# Patient Record
Sex: FEMALE | Race: WHITE | Employment: UNEMPLOYED | URBAN - METROPOLITAN AREA
[De-identification: names, ages, dates, MRNs, and addresses within clinical notes are randomized per-mention and may not be internally consistent; named-entity substitution may affect disease eponyms.]

---

## 2017-11-30 ENCOUNTER — APPOINTMENT (OUTPATIENT)
Dept: GENERAL RADIOLOGY | Facility: CLINIC | Age: 57
End: 2017-11-30
Payer: COMMERCIAL

## 2017-11-30 ENCOUNTER — HOSPITAL ENCOUNTER (EMERGENCY)
Facility: CLINIC | Age: 57
Discharge: HOME | End: 2017-11-30
Attending: EMERGENCY MEDICINE
Payer: COMMERCIAL

## 2017-11-30 VITALS
BODY MASS INDEX: 34.36 KG/M2 | OXYGEN SATURATION: 96 % | DIASTOLIC BLOOD PRESSURE: 73 MMHG | HEART RATE: 81 BPM | WEIGHT: 175 LBS | HEIGHT: 60 IN | TEMPERATURE: 98.4 F | RESPIRATION RATE: 18 BRPM | SYSTOLIC BLOOD PRESSURE: 132 MMHG

## 2017-11-30 DIAGNOSIS — J40 BRONCHITIS: Primary | ICD-10-CM

## 2017-11-30 PROCEDURE — 71020 XR CHEST STANDARD TWO VW: CPT

## 2017-11-30 PROCEDURE — 99283 EMERGENCY DEPT VISIT LOW MDM: CPT

## 2017-11-30 RX ORDER — ZOLPIDEM TARTRATE 10 MG/1
TABLET ORAL NIGHTLY PRN
COMMUNITY

## 2017-11-30 RX ORDER — OMEPRAZOLE 40 MG/1
40 CAPSULE, DELAYED RELEASE ORAL 2 TIMES DAILY
COMMUNITY

## 2017-11-30 RX ORDER — LEVOTHYROXINE SODIUM 0.12 MG/1
125 TABLET ORAL DAILY
COMMUNITY

## 2017-11-30 RX ORDER — SERTRALINE HYDROCHLORIDE 100 MG/1
100 TABLET, FILM COATED ORAL DAILY
Status: ON HOLD | COMMUNITY
End: 2018-07-26 | Stop reason: ALTCHOICE

## 2017-11-30 RX ORDER — FENOFIBRATE 145 MG/1
145 TABLET, COATED ORAL DAILY
COMMUNITY

## 2017-11-30 RX ORDER — ROPINIROLE 0.25 MG/1
0.25 TABLET, FILM COATED ORAL NIGHTLY
COMMUNITY

## 2017-11-30 RX ORDER — CETIRIZINE HYDROCHLORIDE 5 MG/1
5 TABLET ORAL DAILY
COMMUNITY

## 2017-11-30 RX ORDER — FUROSEMIDE 20 MG/1
20 TABLET ORAL DAILY
COMMUNITY

## 2017-11-30 RX ORDER — AZITHROMYCIN 250 MG/1
TABLET, FILM COATED ORAL
Qty: 1 PACKET | Refills: 0 | Status: SHIPPED | OUTPATIENT
Start: 2017-11-30 | End: 2018-07-25

## 2017-11-30 RX ORDER — ATORVASTATIN CALCIUM 80 MG/1
80 TABLET, FILM COATED ORAL DAILY
COMMUNITY

## 2017-11-30 RX ORDER — MULTIVIT-MIN/IRON/FOLIC ACID/K 18-600-40
2000 CAPSULE ORAL DAILY
COMMUNITY

## 2017-11-30 RX ORDER — CLONAZEPAM 0.5 MG/1
0.5 TABLET ORAL 3 TIMES DAILY PRN
COMMUNITY

## 2017-11-30 RX ORDER — GABAPENTIN 600 MG/1
600 TABLET ORAL 4 TIMES DAILY
COMMUNITY

## 2017-11-30 NOTE — ED PROVIDER NOTES
[facility]  [address] [facility] [address]  Phone: [phone]  eMERGENCY dEPARTMENT eNCOUnter      Pt Name: Sandra Powers  MRN: [id number]  [place] 1960  Date of evaluation: 11/30/2017      CHIEF COMPLAINT       Chief Complaint   Patient presents with    Cough     cold, has had for 6 weeks. not improving. has been to her MD, inhalers used and meds prescribed. dx with bronchitis 10/25          HISTORY OF PRESENT ILLNESS    Sandra Powers is a 62 y.o. female who presents To the emergency department with a persistent cough for the past 6 weeks. She states that early on in the illness she was on an antibiotic for her tooth clindamycin and that didn't help. She was also on a short Medrol Dosepak which also did not help. She's been using an inhaler without relief. She denies any chest pain or shortness of breath. Cough is worse at night. She admits to wheezing. Positive productive cough no recent travel or recent sick contacts. REVIEW OF SYSTEMS       Constitutional: No fevers or chills   HEENT: No sore throat, rhinorrhea, or earache   Eyes: No blurry vision or double vision no drainage   Cardiovascular: No chest pain or tachycardia   Respiratory: Positive wheezing no shortness of breath positive cough  Gastrointestinal: No nausea, vomiting, diarrhea, constipation, or abdominal pain   : No hematuria or dysuria   Musculoskeletal: No swelling or pain   Skin: No rash   Neurological: No focal neurologic complaints, paresthesias, weakness, or headache     Review of Systems  PAST MEDICAL HISTORY    has a past medical history of Arthritis; Asthma; Depression; Diabetes mellitus ([address] and Hyperlipidemia. SURGICAL HISTORY      has a past surgical history that includes Hysterectomy and sinus surgery.     CURRENT MEDICATIONS       Previous Medications    ATORVASTATIN (LIPITOR) 80 MG TABLET    Take 80 mg by mouth daily    BUPROPION (WELLBUTRIN XL) 300 MG EXTENDED RELEASE TABLET    Take 300 mg by mouth every morning    CETIRIZINE (ZYRTEC) 5 MG TABLET    Take 5 mg by mouth daily    CHOLECALCIFEROL (VITAMIN D) 2000 UNITS CAPS CAPSULE    Take by mouth    CLONAZEPAM (KLONOPIN) 0.5 MG TABLET    Take 0.5 mg by mouth 2 times daily as needed . FENOFIBRATE (TRICOR) 145 MG TABLET    Take 145 mg by mouth daily    FLUOXETINE (PROZAC) 20 MG CAPSULE    Take 20 mg by mouth daily    FUROSEMIDE (LASIX) 20 MG TABLET    Take 20 mg by mouth daily    GABAPENTIN (NEURONTIN) 600 MG TABLET    Take 600 mg by mouth 4 times daily    LEVOTHYROXINE (SYNTHROID) 125 MCG TABLET    Take 125 mcg by mouth Daily    OMEPRAZOLE (PRILOSEC) 40 MG DELAYED RELEASE CAPSULE    Take 40 mg by mouth daily    ROPINIROLE (REQUIP) 0.25 MG TABLET    Take 0.25 mg by mouth 3 times daily    SERTRALINE (ZOLOFT) 100 MG TABLET    Take 100 mg by mouth daily    ZOLPIDEM (AMBIEN) 10 MG TABLET    Take by mouth nightly as needed for Sleep . ALLERGIES     is allergic to amoxicillin; asa [aspirin]; ciprofloxacin; keflex [cephalexin]; macrobid [nitrofurantoin monohyd macro]; metformin and related; pcn [penicillins]; quinine derivatives; sulfa antibiotics; tylenol with codeine #3 [acetaminophen-codeine]; erythromycin; and tape [adhesive tape]. FAMILY HISTORY     has no family status information on file. family history is not on file. SOCIAL HISTORY      reports that she is a non-smoker but has been exposed to tobacco smoke. She has never used smokeless tobacco. She reports that she does not drink alcohol or use drugs. PHYSICAL EXAM     INITIAL VITALS:  height is 5' (1.524 m) and weight is 79.4 kg (175 lb). Her oral temperature is 98.4 °F (36.9 °C). Her blood pressure is 133/95 (abnormal) and her pulse is 90. Her respiration is 18 and oxygen saturation is 97%.      Constitutional: Alert, oriented x3, nontoxic, answering questions appropriately, acting properly for age, in no acute distress   HEENT: Extraocular muscles intact, mucus membranes °F (36.9 °C) (Oral)   Resp 18   Ht 5' (1.524 m)   Wt 79.4 kg (175 lb)   SpO2 97%   BMI 34.18 kg/m²     11:45 AM x-ray unremarkable for infiltrate. Will discharge and upper scheduled for a Z-Kranthi also given a spacer for her inhaler for better delivery. Follow-up with family physician return if worsening symptoms or any other concerns. I have reviewed the disposition diagnosis with the patient and or their family/guardian. I have answered their questions and given discharge instructions. They voiced understanding of these instructions and did not have any further questions or complaints. CRITICAL CARE:    None    CONSULTS:    None    PROCEDURES:    None      OARRS Report if indicated             FINAL IMPRESSION      1. Bronchitis          DISPOSITION/PLAN   DISPOSITION Decision to Discharge      PATIENT REFERRED TO:  Family Physician            DISCHARGE MEDICATIONS:  New Prescriptions    AZITHROMYCIN (ZITHROMAX) 250 MG TABLET    Take 2 tablets (500 mg) on Day 1, followed by 1 tablet (250 mg) once daily on Days 2 through 5.        (Please note that portions of this note were completed with a voice recognition program.  Efforts were made to edit the dictations but occasionally words are mis-transcribed.)    Cinda Garcia, DO  Attending Emergency Physician       Cinda Garcia, DO  11/30/17 7028

## 2018-07-25 ENCOUNTER — APPOINTMENT (OUTPATIENT)
Dept: GENERAL RADIOLOGY | Age: 58
DRG: 101 | End: 2018-07-25
Payer: COMMERCIAL

## 2018-07-25 ENCOUNTER — HOSPITAL ENCOUNTER (INPATIENT)
Age: 58
LOS: 1 days | Discharge: HOME | DRG: 101 | End: 2018-07-27
Attending: EMERGENCY MEDICINE | Admitting: INTERNAL MEDICINE
Payer: COMMERCIAL

## 2018-07-25 ENCOUNTER — APPOINTMENT (OUTPATIENT)
Dept: CT IMAGING | Age: 58
DRG: 101 | End: 2018-07-25
Payer: COMMERCIAL

## 2018-07-25 DIAGNOSIS — R56.9 SEIZURE-LIKE ACTIVITY (HCC): Primary | ICD-10-CM

## 2018-07-25 LAB
% CKMB: 1.7 % (ref 0–3)
ABSOLUTE EOS #: 0.2 K/UL (ref 0–0.4)
ABSOLUTE IMMATURE GRANULOCYTE: ABNORMAL K/UL (ref 0–0.3)
ABSOLUTE LYMPH #: 2.2 K/UL (ref 1–4.8)
ABSOLUTE MONO #: 0.7 K/UL (ref 0.2–0.8)
ACETAMINOPHEN LEVEL: <10 UG/ML (ref 10–30)
ALBUMIN SERPL-MCNC: 4.5 G/DL (ref 3.5–5.2)
ALBUMIN/GLOBULIN RATIO: ABNORMAL (ref 1–2.5)
ALP BLD-CCNC: 89 U/L (ref 35–104)
ALT SERPL-CCNC: 34 U/L (ref 5–33)
AMMONIA: 44 UMOL/L (ref 11–51)
ANION GAP SERPL CALCULATED.3IONS-SCNC: 19 MMOL/L (ref 9–17)
AST SERPL-CCNC: 44 U/L
BASOPHILS # BLD: 1 % (ref 0–2)
BASOPHILS ABSOLUTE: 0.1 K/UL (ref 0–0.2)
BILIRUB SERPL-MCNC: 0.35 MG/DL (ref 0.3–1.2)
BILIRUBIN DIRECT: 0.1 MG/DL
BILIRUBIN, INDIRECT: 0.25 MG/DL (ref 0–1)
BUN BLDV-MCNC: 19 MG/DL (ref 6–20)
BUN/CREAT BLD: 13 (ref 9–20)
CALCIUM SERPL-MCNC: 10.3 MG/DL (ref 8.6–10.4)
CHLORIDE BLD-SCNC: 98 MMOL/L (ref 98–107)
CK MB: 1.4 NG/ML
CKMB INTERPRETATION: NORMAL
CO2: 23 MMOL/L (ref 20–31)
CREAT SERPL-MCNC: 1.5 MG/DL (ref 0.5–0.9)
DIFFERENTIAL TYPE: ABNORMAL
EKG ATRIAL RATE: 99 BPM
EKG P AXIS: 58 DEGREES
EKG P-R INTERVAL: 152 MS
EKG Q-T INTERVAL: 380 MS
EKG QRS DURATION: 86 MS
EKG QTC CALCULATION (BAZETT): 487 MS
EKG R AXIS: 84 DEGREES
EKG T AXIS: 46 DEGREES
EKG VENTRICULAR RATE: 99 BPM
EOSINOPHILS RELATIVE PERCENT: 2 % (ref 1–4)
ETHANOL PERCENT: <0.01 %
ETHANOL: <10 MG/DL
GFR AFRICAN AMERICAN: 43 ML/MIN
GFR NON-AFRICAN AMERICAN: 36 ML/MIN
GFR SERPL CREATININE-BSD FRML MDRD: ABNORMAL ML/MIN/{1.73_M2}
GFR SERPL CREATININE-BSD FRML MDRD: ABNORMAL ML/MIN/{1.73_M2}
GLOBULIN: ABNORMAL G/DL (ref 1.5–3.8)
GLUCOSE BLD-MCNC: 208 MG/DL (ref 65–105)
GLUCOSE BLD-MCNC: 213 MG/DL (ref 70–99)
HCT VFR BLD CALC: 44.4 % (ref 36–46)
HEMOGLOBIN: 14.7 G/DL (ref 12–16)
IMMATURE GRANULOCYTES: ABNORMAL %
LYMPHOCYTES # BLD: 21 % (ref 24–44)
MAGNESIUM: 1.7 MG/DL (ref 1.6–2.6)
MCH RBC QN AUTO: 28.1 PG (ref 26–34)
MCHC RBC AUTO-ENTMCNC: 33.1 G/DL (ref 31–37)
MCV RBC AUTO: 84.8 FL (ref 80–100)
MONOCYTES # BLD: 6 % (ref 1–7)
MYOGLOBIN: 112 NG/ML (ref 25–58)
NRBC AUTOMATED: ABNORMAL PER 100 WBC
PDW BLD-RTO: 14.3 % (ref 11.5–14.5)
PLATELET # BLD: 254 K/UL (ref 130–400)
PLATELET ESTIMATE: ABNORMAL
PMV BLD AUTO: 8.4 FL (ref 6–12)
POTASSIUM SERPL-SCNC: 3.5 MMOL/L (ref 3.7–5.3)
RBC # BLD: 5.24 M/UL (ref 4–5.2)
RBC # BLD: ABNORMAL 10*6/UL
SALICYLATE LEVEL: <1 MG/DL (ref 3–10)
SEG NEUTROPHILS: 70 % (ref 36–66)
SEGMENTED NEUTROPHILS ABSOLUTE COUNT: 7.2 K/UL (ref 1.8–7.7)
SODIUM BLD-SCNC: 140 MMOL/L (ref 135–144)
TOTAL CK: 82 U/L (ref 26–192)
TOTAL PROTEIN: 7.3 G/DL (ref 6.4–8.3)
TOXIC TRICYCLIC SC,BLOOD: NEGATIVE
TROPONIN INTERP: ABNORMAL
TROPONIN T: <0.03 NG/ML
WBC # BLD: 10.4 K/UL (ref 3.5–11)
WBC # BLD: ABNORMAL 10*3/UL

## 2018-07-25 PROCEDURE — G0480 DRUG TEST DEF 1-7 CLASSES: HCPCS

## 2018-07-25 PROCEDURE — 82550 ASSAY OF CK (CPK): CPT

## 2018-07-25 PROCEDURE — 93005 ELECTROCARDIOGRAM TRACING: CPT

## 2018-07-25 PROCEDURE — 2580000003 HC RX 258: Performed by: EMERGENCY MEDICINE

## 2018-07-25 PROCEDURE — 80048 BASIC METABOLIC PNL TOTAL CA: CPT

## 2018-07-25 PROCEDURE — 99285 EMERGENCY DEPT VISIT HI MDM: CPT

## 2018-07-25 PROCEDURE — 80307 DRUG TEST PRSMV CHEM ANLYZR: CPT

## 2018-07-25 PROCEDURE — 96374 THER/PROPH/DIAG INJ IV PUSH: CPT

## 2018-07-25 PROCEDURE — 6360000002 HC RX W HCPCS: Performed by: EMERGENCY MEDICINE

## 2018-07-25 PROCEDURE — 71045 X-RAY EXAM CHEST 1 VIEW: CPT

## 2018-07-25 PROCEDURE — 84484 ASSAY OF TROPONIN QUANT: CPT

## 2018-07-25 PROCEDURE — 70450 CT HEAD/BRAIN W/O DYE: CPT

## 2018-07-25 PROCEDURE — 80076 HEPATIC FUNCTION PANEL: CPT

## 2018-07-25 PROCEDURE — 82553 CREATINE MB FRACTION: CPT

## 2018-07-25 PROCEDURE — 83874 ASSAY OF MYOGLOBIN: CPT

## 2018-07-25 PROCEDURE — 87086 URINE CULTURE/COLONY COUNT: CPT

## 2018-07-25 PROCEDURE — 85025 COMPLETE CBC W/AUTO DIFF WBC: CPT

## 2018-07-25 PROCEDURE — 96375 TX/PRO/DX INJ NEW DRUG ADDON: CPT

## 2018-07-25 PROCEDURE — 82140 ASSAY OF AMMONIA: CPT

## 2018-07-25 PROCEDURE — 82947 ASSAY GLUCOSE BLOOD QUANT: CPT

## 2018-07-25 PROCEDURE — 83735 ASSAY OF MAGNESIUM: CPT

## 2018-07-25 RX ORDER — ONDANSETRON 2 MG/ML
4 INJECTION INTRAMUSCULAR; INTRAVENOUS ONCE
Status: COMPLETED | OUTPATIENT
Start: 2018-07-25 | End: 2018-07-25

## 2018-07-25 RX ORDER — SODIUM CHLORIDE 9 MG/ML
INJECTION, SOLUTION INTRAVENOUS CONTINUOUS
Status: DISCONTINUED | OUTPATIENT
Start: 2018-07-25 | End: 2018-07-26 | Stop reason: SDUPTHER

## 2018-07-25 RX ORDER — FENTANYL CITRATE 50 UG/ML
50 INJECTION, SOLUTION INTRAMUSCULAR; INTRAVENOUS ONCE
Status: COMPLETED | OUTPATIENT
Start: 2018-07-25 | End: 2018-07-25

## 2018-07-25 RX ADMIN — SODIUM CHLORIDE: 9 INJECTION, SOLUTION INTRAVENOUS at 22:03

## 2018-07-25 RX ADMIN — ONDANSETRON HYDROCHLORIDE 4 MG: 2 INJECTION, SOLUTION INTRAMUSCULAR; INTRAVENOUS at 22:26

## 2018-07-25 RX ADMIN — FENTANYL CITRATE 50 MCG: 50 INJECTION, SOLUTION INTRAMUSCULAR; INTRAVENOUS at 22:26

## 2018-07-25 ASSESSMENT — PAIN DESCRIPTION - LOCATION: LOCATION: HEAD

## 2018-07-25 ASSESSMENT — PAIN DESCRIPTION - PAIN TYPE: TYPE: ACUTE PAIN

## 2018-07-25 ASSESSMENT — PAIN SCALES - GENERAL
PAINLEVEL_OUTOF10: 7
PAINLEVEL_OUTOF10: 7

## 2018-07-25 ASSESSMENT — PAIN DESCRIPTION - DESCRIPTORS: DESCRIPTORS: HEADACHE

## 2018-07-26 ENCOUNTER — APPOINTMENT (OUTPATIENT)
Dept: MRI IMAGING | Age: 58
DRG: 101 | End: 2018-07-26
Payer: COMMERCIAL

## 2018-07-26 PROBLEM — F41.8 DEPRESSION WITH ANXIETY: Status: ACTIVE | Noted: 2018-04-11

## 2018-07-26 PROBLEM — M19.90 OSTEOARTHROSIS: Status: ACTIVE | Noted: 2018-07-26

## 2018-07-26 PROBLEM — M51.369 DEGENERATION OF LUMBAR INTERVERTEBRAL DISC: Status: ACTIVE | Noted: 2018-07-26

## 2018-07-26 PROBLEM — L98.9 SKIN LESION: Status: ACTIVE | Noted: 2018-07-26

## 2018-07-26 PROBLEM — S83.249A TEAR OF MEDIAL MENISCUS OF KNEE: Status: ACTIVE | Noted: 2018-07-26

## 2018-07-26 PROBLEM — S83.209A TEAR OF MENISCUS OF KNEE: Status: ACTIVE | Noted: 2018-07-26

## 2018-07-26 PROBLEM — R21 SKIN ERUPTION: Status: ACTIVE | Noted: 2018-07-26

## 2018-07-26 PROBLEM — Z78.9 NON-SMOKER: Status: ACTIVE | Noted: 2017-03-10

## 2018-07-26 PROBLEM — J30.2 SEASONAL ALLERGIC RHINITIS: Status: ACTIVE | Noted: 2017-06-15

## 2018-07-26 PROBLEM — G89.4 CHRONIC PAIN DISORDER: Status: ACTIVE | Noted: 2017-10-25

## 2018-07-26 PROBLEM — M25.569 KNEE PAIN: Status: ACTIVE | Noted: 2018-07-26

## 2018-07-26 PROBLEM — M51.36 DEGENERATION OF LUMBAR INTERVERTEBRAL DISC: Status: ACTIVE | Noted: 2018-07-26

## 2018-07-26 PROBLEM — M71.20 SYNOVIAL CYST OF KNEE: Status: ACTIVE | Noted: 2018-07-26

## 2018-07-26 PROBLEM — Z12.31 ENCOUNTER FOR SCREENING MAMMOGRAM FOR MALIGNANT NEOPLASM OF BREAST: Status: ACTIVE | Noted: 2017-03-10

## 2018-07-26 PROBLEM — R07.89 OTHER CHEST PAIN: Status: ACTIVE | Noted: 2018-06-01

## 2018-07-26 PROBLEM — M17.9 OSTEOARTHRITIS OF KNEE: Status: ACTIVE | Noted: 2018-07-26

## 2018-07-26 PROBLEM — M70.50 PES ANSERINUS BURSITIS: Status: ACTIVE | Noted: 2018-07-26

## 2018-07-26 PROBLEM — R56.9 SEIZURE (HCC): Status: ACTIVE | Noted: 2018-07-26

## 2018-07-26 LAB
ALBUMIN SERPL-MCNC: 4.2 G/DL (ref 3.5–5.2)
ALBUMIN/GLOBULIN RATIO: ABNORMAL (ref 1–2.5)
ALP BLD-CCNC: 77 U/L (ref 35–104)
ALT SERPL-CCNC: 29 U/L (ref 5–33)
AMPHETAMINE SCREEN URINE: NEGATIVE
ANION GAP SERPL CALCULATED.3IONS-SCNC: 11 MMOL/L (ref 9–17)
AST SERPL-CCNC: 33 U/L
BARBITURATE SCREEN URINE: NEGATIVE
BENZODIAZEPINE SCREEN, URINE: NEGATIVE
BILIRUB SERPL-MCNC: 0.32 MG/DL (ref 0.3–1.2)
BILIRUBIN DIRECT: 0.1 MG/DL
BILIRUBIN URINE: NEGATIVE
BILIRUBIN, INDIRECT: 0.22 MG/DL (ref 0–1)
BUN BLDV-MCNC: 25 MG/DL (ref 6–20)
BUPRENORPHINE URINE: NORMAL
CALCIUM SERPL-MCNC: 9.3 MG/DL (ref 8.6–10.4)
CANNABINOID SCREEN URINE: NEGATIVE
CHLORIDE BLD-SCNC: 104 MMOL/L (ref 98–107)
CO2: 26 MMOL/L (ref 20–31)
COCAINE METABOLITE, URINE: NEGATIVE
COLOR: YELLOW
COMMENT UA: NORMAL
CREAT SERPL-MCNC: 1.38 MG/DL (ref 0.5–0.9)
GFR AFRICAN AMERICAN: 48 ML/MIN
GFR NON-AFRICAN AMERICAN: 39 ML/MIN
GFR SERPL CREATININE-BSD FRML MDRD: ABNORMAL ML/MIN/{1.73_M2}
GFR SERPL CREATININE-BSD FRML MDRD: ABNORMAL ML/MIN/{1.73_M2}
GLUCOSE BLD-MCNC: 129 MG/DL (ref 65–105)
GLUCOSE BLD-MCNC: 140 MG/DL (ref 65–105)
GLUCOSE BLD-MCNC: 143 MG/DL (ref 65–105)
GLUCOSE BLD-MCNC: 152 MG/DL (ref 65–105)
GLUCOSE BLD-MCNC: 157 MG/DL (ref 70–99)
GLUCOSE BLD-MCNC: 82 MG/DL (ref 65–105)
GLUCOSE URINE: NEGATIVE
KETONES, URINE: NEGATIVE
LEUKOCYTE ESTERASE, URINE: NEGATIVE
MDMA URINE: NORMAL
METHADONE SCREEN, URINE: NEGATIVE
METHAMPHETAMINE, URINE: NORMAL
NITRITE, URINE: NEGATIVE
OPIATES, URINE: NEGATIVE
OXYCODONE SCREEN URINE: NEGATIVE
PH UA: 6.5 (ref 5–8)
PHENCYCLIDINE, URINE: NEGATIVE
POTASSIUM SERPL-SCNC: 3.9 MMOL/L (ref 3.7–5.3)
PROPOXYPHENE, URINE: NORMAL
PROTEIN UA: NEGATIVE
SODIUM BLD-SCNC: 141 MMOL/L (ref 135–144)
SPECIFIC GRAVITY UA: 1.01 (ref 1–1.03)
TEST INFORMATION: NORMAL
THYROXINE, FREE: 1.34 NG/DL (ref 0.93–1.7)
TOTAL PROTEIN: 6.3 G/DL (ref 6.4–8.3)
TRICYCLIC ANTIDEPRESSANTS, UR: NORMAL
TSH SERPL DL<=0.05 MIU/L-ACNC: 0.21 MIU/L (ref 0.3–5)
TURBIDITY: CLEAR
URINE HGB: NEGATIVE
UROBILINOGEN, URINE: NORMAL

## 2018-07-26 PROCEDURE — 36415 COLL VENOUS BLD VENIPUNCTURE: CPT

## 2018-07-26 PROCEDURE — 97161 PT EVAL LOW COMPLEX 20 MIN: CPT

## 2018-07-26 PROCEDURE — 6370000000 HC RX 637 (ALT 250 FOR IP): Performed by: INTERNAL MEDICINE

## 2018-07-26 PROCEDURE — 97530 THERAPEUTIC ACTIVITIES: CPT

## 2018-07-26 PROCEDURE — 2580000003 HC RX 258: Performed by: NURSE PRACTITIONER

## 2018-07-26 PROCEDURE — 6370000000 HC RX 637 (ALT 250 FOR IP): Performed by: NURSE PRACTITIONER

## 2018-07-26 PROCEDURE — 70551 MRI BRAIN STEM W/O DYE: CPT

## 2018-07-26 PROCEDURE — 80053 COMPREHEN METABOLIC PANEL: CPT

## 2018-07-26 PROCEDURE — 97116 GAIT TRAINING THERAPY: CPT

## 2018-07-26 PROCEDURE — 82947 ASSAY GLUCOSE BLOOD QUANT: CPT

## 2018-07-26 PROCEDURE — G8978 MOBILITY CURRENT STATUS: HCPCS

## 2018-07-26 PROCEDURE — 84443 ASSAY THYROID STIM HORMONE: CPT

## 2018-07-26 PROCEDURE — 84439 ASSAY OF FREE THYROXINE: CPT

## 2018-07-26 PROCEDURE — 82248 BILIRUBIN DIRECT: CPT

## 2018-07-26 PROCEDURE — 6360000002 HC RX W HCPCS: Performed by: NURSE PRACTITIONER

## 2018-07-26 PROCEDURE — 81003 URINALYSIS AUTO W/O SCOPE: CPT

## 2018-07-26 PROCEDURE — 95816 EEG AWAKE AND DROWSY: CPT

## 2018-07-26 PROCEDURE — 1200000000 HC SEMI PRIVATE

## 2018-07-26 PROCEDURE — 99223 1ST HOSP IP/OBS HIGH 75: CPT | Performed by: INTERNAL MEDICINE

## 2018-07-26 PROCEDURE — G8979 MOBILITY GOAL STATUS: HCPCS

## 2018-07-26 RX ORDER — SODIUM CHLORIDE 0.9 % (FLUSH) 0.9 %
10 SYRINGE (ML) INJECTION EVERY 12 HOURS SCHEDULED
Status: DISCONTINUED | OUTPATIENT
Start: 2018-07-26 | End: 2018-07-27 | Stop reason: HOSPADM

## 2018-07-26 RX ORDER — NICOTINE POLACRILEX 4 MG
15 LOZENGE BUCCAL PRN
Status: DISCONTINUED | OUTPATIENT
Start: 2018-07-26 | End: 2018-07-27 | Stop reason: HOSPADM

## 2018-07-26 RX ORDER — ATORVASTATIN CALCIUM 80 MG/1
80 TABLET, FILM COATED ORAL DAILY
Status: DISCONTINUED | OUTPATIENT
Start: 2018-07-26 | End: 2018-07-27 | Stop reason: HOSPADM

## 2018-07-26 RX ORDER — PANTOPRAZOLE SODIUM 40 MG/1
40 TABLET, DELAYED RELEASE ORAL
Status: DISCONTINUED | OUTPATIENT
Start: 2018-07-27 | End: 2018-07-27 | Stop reason: HOSPADM

## 2018-07-26 RX ORDER — DEXTROSE MONOHYDRATE 50 MG/ML
100 INJECTION, SOLUTION INTRAVENOUS PRN
Status: DISCONTINUED | OUTPATIENT
Start: 2018-07-26 | End: 2018-07-27 | Stop reason: HOSPADM

## 2018-07-26 RX ORDER — KETOROLAC TROMETHAMINE 15 MG/ML
15 INJECTION, SOLUTION INTRAMUSCULAR; INTRAVENOUS EVERY 6 HOURS
Status: DISCONTINUED | OUTPATIENT
Start: 2018-07-26 | End: 2018-07-27 | Stop reason: HOSPADM

## 2018-07-26 RX ORDER — PANTOPRAZOLE SODIUM 40 MG/1
40 TABLET, DELAYED RELEASE ORAL
Status: DISCONTINUED | OUTPATIENT
Start: 2018-07-26 | End: 2018-07-26

## 2018-07-26 RX ORDER — BUPROPION HYDROCHLORIDE 150 MG/1
300 TABLET ORAL EVERY MORNING
Status: DISCONTINUED | OUTPATIENT
Start: 2018-07-26 | End: 2018-07-26

## 2018-07-26 RX ORDER — LEVOTHYROXINE SODIUM 0.12 MG/1
125 TABLET ORAL DAILY
Status: DISCONTINUED | OUTPATIENT
Start: 2018-07-26 | End: 2018-07-27 | Stop reason: HOSPADM

## 2018-07-26 RX ORDER — DEXTROSE MONOHYDRATE 25 G/50ML
12.5 INJECTION, SOLUTION INTRAVENOUS PRN
Status: DISCONTINUED | OUTPATIENT
Start: 2018-07-26 | End: 2018-07-27 | Stop reason: HOSPADM

## 2018-07-26 RX ORDER — TIZANIDINE 2 MG/1
2 TABLET ORAL 2 TIMES DAILY PRN
COMMUNITY

## 2018-07-26 RX ORDER — OXYCODONE HYDROCHLORIDE AND ACETAMINOPHEN 5; 325 MG/1; MG/1
1 TABLET ORAL EVERY 8 HOURS PRN
COMMUNITY

## 2018-07-26 RX ORDER — GABAPENTIN 600 MG/1
600 TABLET ORAL 4 TIMES DAILY
Status: DISCONTINUED | OUTPATIENT
Start: 2018-07-26 | End: 2018-07-26

## 2018-07-26 RX ORDER — ONDANSETRON 2 MG/ML
4 INJECTION INTRAMUSCULAR; INTRAVENOUS EVERY 6 HOURS PRN
Status: DISCONTINUED | OUTPATIENT
Start: 2018-07-26 | End: 2018-07-27 | Stop reason: HOSPADM

## 2018-07-26 RX ORDER — FENOFIBRATE 160 MG/1
160 TABLET ORAL DAILY
Status: DISCONTINUED | OUTPATIENT
Start: 2018-07-26 | End: 2018-07-27 | Stop reason: HOSPADM

## 2018-07-26 RX ORDER — GABAPENTIN 300 MG/1
600 CAPSULE ORAL 2 TIMES DAILY
Status: DISCONTINUED | OUTPATIENT
Start: 2018-07-26 | End: 2018-07-27 | Stop reason: HOSPADM

## 2018-07-26 RX ORDER — ONDANSETRON 4 MG/1
4 TABLET, ORALLY DISINTEGRATING ORAL EVERY 6 HOURS PRN
Status: DISCONTINUED | OUTPATIENT
Start: 2018-07-26 | End: 2018-07-27 | Stop reason: HOSPADM

## 2018-07-26 RX ORDER — SODIUM CHLORIDE 0.9 % (FLUSH) 0.9 %
10 SYRINGE (ML) INJECTION PRN
Status: DISCONTINUED | OUTPATIENT
Start: 2018-07-26 | End: 2018-07-27 | Stop reason: HOSPADM

## 2018-07-26 RX ORDER — FUROSEMIDE 20 MG/1
20 TABLET ORAL DAILY
Status: DISCONTINUED | OUTPATIENT
Start: 2018-07-26 | End: 2018-07-27 | Stop reason: HOSPADM

## 2018-07-26 RX ORDER — LORAZEPAM 2 MG/ML
2 INJECTION INTRAMUSCULAR EVERY 4 HOURS PRN
Status: DISCONTINUED | OUTPATIENT
Start: 2018-07-26 | End: 2018-07-27 | Stop reason: HOSPADM

## 2018-07-26 RX ORDER — KETOROLAC TROMETHAMINE 30 MG/ML
30 INJECTION, SOLUTION INTRAMUSCULAR; INTRAVENOUS EVERY 6 HOURS
Status: DISCONTINUED | OUTPATIENT
Start: 2018-07-26 | End: 2018-07-26 | Stop reason: DRUGHIGH

## 2018-07-26 RX ORDER — ALBUTEROL SULFATE 90 UG/1
2 AEROSOL, METERED RESPIRATORY (INHALATION) EVERY 6 HOURS PRN
COMMUNITY

## 2018-07-26 RX ORDER — ROPINIROLE 0.25 MG/1
0.25 TABLET, FILM COATED ORAL NIGHTLY
Status: DISCONTINUED | OUTPATIENT
Start: 2018-07-26 | End: 2018-07-27 | Stop reason: HOSPADM

## 2018-07-26 RX ORDER — FLUOXETINE HYDROCHLORIDE 20 MG/1
20 CAPSULE ORAL DAILY
Status: DISCONTINUED | OUTPATIENT
Start: 2018-07-26 | End: 2018-07-26

## 2018-07-26 RX ORDER — ACETAMINOPHEN 500 MG
1000 TABLET ORAL EVERY 6 HOURS PRN
Status: DISCONTINUED | OUTPATIENT
Start: 2018-07-26 | End: 2018-07-27 | Stop reason: HOSPADM

## 2018-07-26 RX ORDER — ROPINIROLE 0.25 MG/1
0.25 TABLET, FILM COATED ORAL 3 TIMES DAILY
Status: DISCONTINUED | OUTPATIENT
Start: 2018-07-26 | End: 2018-07-26

## 2018-07-26 RX ORDER — NICOTINE 21 MG/24HR
1 PATCH, TRANSDERMAL 24 HOURS TRANSDERMAL DAILY PRN
Status: DISCONTINUED | OUTPATIENT
Start: 2018-07-26 | End: 2018-07-27 | Stop reason: HOSPADM

## 2018-07-26 RX ORDER — RANITIDINE 300 MG/1
300 TABLET ORAL NIGHTLY
COMMUNITY

## 2018-07-26 RX ORDER — SODIUM CHLORIDE 9 MG/ML
INJECTION, SOLUTION INTRAVENOUS CONTINUOUS
Status: DISCONTINUED | OUTPATIENT
Start: 2018-07-26 | End: 2018-07-27 | Stop reason: HOSPADM

## 2018-07-26 RX ORDER — ONDANSETRON 2 MG/ML
4 INJECTION INTRAMUSCULAR; INTRAVENOUS EVERY 6 HOURS PRN
Status: DISCONTINUED | OUTPATIENT
Start: 2018-07-26 | End: 2018-07-26 | Stop reason: SDUPTHER

## 2018-07-26 RX ORDER — FLUOXETINE HYDROCHLORIDE 20 MG/1
60 CAPSULE ORAL DAILY
Status: DISCONTINUED | OUTPATIENT
Start: 2018-07-26 | End: 2018-07-27 | Stop reason: HOSPADM

## 2018-07-26 RX ORDER — BISACODYL 10 MG
10 SUPPOSITORY, RECTAL RECTAL DAILY PRN
Status: DISCONTINUED | OUTPATIENT
Start: 2018-07-26 | End: 2018-07-27 | Stop reason: HOSPADM

## 2018-07-26 RX ORDER — FAMOTIDINE 20 MG/1
20 TABLET, FILM COATED ORAL NIGHTLY
Status: DISCONTINUED | OUTPATIENT
Start: 2018-07-26 | End: 2018-07-27 | Stop reason: HOSPADM

## 2018-07-26 RX ADMIN — SODIUM CHLORIDE: 9 INJECTION, SOLUTION INTRAVENOUS at 10:48

## 2018-07-26 RX ADMIN — ENOXAPARIN SODIUM 40 MG: 40 INJECTION SUBCUTANEOUS at 10:44

## 2018-07-26 RX ADMIN — KETOROLAC TROMETHAMINE 15 MG: 15 INJECTION, SOLUTION INTRAMUSCULAR; INTRAVENOUS at 17:53

## 2018-07-26 RX ADMIN — FUROSEMIDE 20 MG: 20 TABLET ORAL at 15:05

## 2018-07-26 RX ADMIN — ROPINIROLE HYDROCHLORIDE 0.25 MG: 0.25 TABLET, FILM COATED ORAL at 20:42

## 2018-07-26 RX ADMIN — ATORVASTATIN CALCIUM 80 MG: 80 TABLET, FILM COATED ORAL at 20:42

## 2018-07-26 RX ADMIN — INSULIN LISPRO 2 UNITS: 100 INJECTION, SOLUTION INTRAVENOUS; SUBCUTANEOUS at 12:37

## 2018-07-26 RX ADMIN — KETOROLAC TROMETHAMINE 15 MG: 15 INJECTION, SOLUTION INTRAMUSCULAR; INTRAVENOUS at 10:44

## 2018-07-26 RX ADMIN — FLUOXETINE 60 MG: 20 CAPSULE ORAL at 17:52

## 2018-07-26 RX ADMIN — SODIUM CHLORIDE: 9 INJECTION, SOLUTION INTRAVENOUS at 04:32

## 2018-07-26 RX ADMIN — SODIUM CHLORIDE: 9 INJECTION, SOLUTION INTRAVENOUS at 22:44

## 2018-07-26 RX ADMIN — LINAGLIPTIN 5 MG: 5 TABLET, FILM COATED ORAL at 17:54

## 2018-07-26 RX ADMIN — GABAPENTIN 600 MG: 300 CAPSULE ORAL at 20:42

## 2018-07-26 RX ADMIN — FAMOTIDINE 20 MG: 20 TABLET ORAL at 20:42

## 2018-07-26 RX ADMIN — KETOROLAC TROMETHAMINE 15 MG: 15 INJECTION, SOLUTION INTRAMUSCULAR; INTRAVENOUS at 22:40

## 2018-07-26 ASSESSMENT — PAIN SCALES - GENERAL
PAINLEVEL_OUTOF10: 7
PAINLEVEL_OUTOF10: 9
PAINLEVEL_OUTOF10: 9
PAINLEVEL_OUTOF10: 7
PAINLEVEL_OUTOF10: 5
PAINLEVEL_OUTOF10: 10

## 2018-07-26 ASSESSMENT — PAIN DESCRIPTION - PAIN TYPE
TYPE: ACUTE PAIN

## 2018-07-26 ASSESSMENT — PAIN DESCRIPTION - ONSET
ONSET: ON-GOING
ONSET: ON-GOING

## 2018-07-26 ASSESSMENT — PAIN DESCRIPTION - PROGRESSION: CLINICAL_PROGRESSION: GRADUALLY IMPROVING

## 2018-07-26 ASSESSMENT — PAIN DESCRIPTION - DESCRIPTORS
DESCRIPTORS: HEADACHE
DESCRIPTORS: HEADACHE

## 2018-07-26 ASSESSMENT — PAIN DESCRIPTION - LOCATION
LOCATION: HEAD

## 2018-07-26 ASSESSMENT — PAIN DESCRIPTION - FREQUENCY
FREQUENCY: CONTINUOUS
FREQUENCY: INTERMITTENT

## 2018-07-26 NOTE — PLAN OF CARE
Problem: Falls - Risk of:  Goal: Will remain free from falls  Will remain free from falls   Outcome: Ongoing  Patient is a fall risk during this admission. Fall risk assessment was performed. Patient is absent of falls. Bed is in the lowest position. Wheels on the bed are locked. Call light and bed side table are within reach. Clutter is removed. Patient was educated to call out when needing assistance or wanting to get out of bed. Patient offered toileting assistance during rounding. Hourly rounds have been performed.

## 2018-07-26 NOTE — CONSULTS
[address], [address] [place] [address]                                   CONSULTATION    PATIENT NAME: Loretta Gutierrez                   :        1960  MED REC NO:   [id number]                             ROOM:       2020  ACCOUNT NO:   [de-identified]                           ADMIT DATE: 2018  PROVIDER:     Constantine Aguila DATE:  2018    HISTORY OF PRESENT ILLNESS:  This patient is a 49-year-old female whom I am  asked to see in Neurology consultation for advice and opinion regarding  seizures. The patient states that she has not been feeling well recently. She was at a restaurant when she felt very ill and developed shaking,  jerking movements with poor responsiveness with postictal confusion,  fatigue, and headache. She did not bite her tongue. She did not lose  control of her bladder or bowel. She had no preceding aura. She has had  no recent head injuries. She has had no history of meningitis or  encephalitis. She has never had a seizure before. There is no family  history of seizures. She states that she has been feeling ill for some  time. She did turn blue and was \"foaming at the mouth. \"  The episode  lasted for about a minute. She does not remember anything about that. PAST MEDICAL HISTORY:  Significant for arthritis, asthma, depression,  diabetes, hyperlipidemia, but negative for cancer, stroke, or seizures. FAMILY HISTORY:  Noncontributory. REVIEW OF SYSTEMS:  She denied any chest pain, shortness of breath,  abdominal pain, hematochezia, hematuria, nosebleed, rash, or fever. PERSONAL AND SOCIAL HISTORY:  She is a nonsmoker, but is exposed to  secondhand smoke. She does not use alcohol or street drugs. MEDICATIONS:  Are as on the electronic health record.     ALLERGIES:  She has allergies to AMOXICILLIN, ASPIRIN, CIPROFLOXACIN,  KEFLEX, MACROBID, METFORMIN, PENICILLIN, QUININE,

## 2018-07-26 NOTE — PROGRESS NOTES
Pharmacy Accuracy Service Medication History Note    The patient's list of current home medications has been reviewed. Source(s) of information: patient, Maria Guadalupe Nieves    Based on information provided by the above source(s), I have updated the patient's home med list as described below. Please review the ACTION REQUESTED BY PHYSICIAN section of this note below for any discrepancies on current hospital orders. I changed or updated the following medications on the patient's home medication list:  Discontinued · Sertraline 100mg (takes Fluoxetine instead-already on list)     Added · Tresiba Flextouch 120 units every morning (note: pt admits she does not take this regularly due to laziness/forgetfulness. States her last dose was about 2 days ago)  · Percocet 5/325mg TID prn  · Ranitidine 300mg nightly  · Sitagliptin 50mg daily  · Albuterol HFA 2 puffs q6h prn  · Tizanidine 2mg BID prn     Adjusted   · Bupropion XL 300mg daily adjusted to XL 450mg daily  · Vitamin D 2000 units (no directions) adjusted to once daily  · Fluoxetine 20mg daily adjusted to 60mg daily  · Omeprazole 40mg daily adjusted to 40mg BID  · Ropinirole 0.25mg TID adjusted to 0.25mg nightly         PHYSICIAN ACTION REQUESTED  Discrepancies on current hospital orders that need to be addressed by a physician:    Medication Action Requested   Meds added   (see above)   Please review and reorder as appropriate   Bupropion XL   Contraindicated with seizure. Please assess whether pt is to continue this medication inpatient--the order is active although she has not yet received any doses in hospital.     Gabapentin   Recommended dosing frequency for current CrCl 40mL/min is BID instead of QID. Please review and adjust order.      Fluoxetine,  Pantoprazole    Please adjust order to match home regimen as appopriate:        Fluoxetine 60mg daily      Pantoprazole 40mg BID           Please feel free to call me with any questions about this encounter. Thank you. Sonya Calderon, PharmD  Pharmacy Medication Accuracy Review Service  Phone:  [phone]  Fax: [phone]      Electronically signed by JUAN ANTONIO Heck [facility] on 7/26/2018 at 3:21 PM           Prescriptions Prior to Admission:   Insulin Degludec (TRESIBA FLEXTOUCH) 100 UNIT/ML SOPN, Inject 120 Units into the skin every morning  oxyCODONE-acetaminophen (PERCOCET) 5-325 MG per tablet, Take 1 tablet by mouth every 8 hours as needed for Pain. .  ranitidine (ZANTAC) 300 MG tablet, Take 300 mg by mouth nightly  SITagliptin (JANUVIA) 50 MG tablet, Take 50 mg by mouth daily  tiZANidine (ZANAFLEX) 2 MG tablet, Take 2 mg by mouth 2 times daily as needed  albuterol sulfate  (90 Base) MCG/ACT inhaler, Inhale 2 puffs into the lungs every 6 hours as needed for Shortness of Breath  levothyroxine (SYNTHROID) 125 MCG tablet, Take 125 mcg by mouth Daily  BUPROPION HCL ER, XL, PO, Take 450 mg by mouth every morning Takes XL 300mg + XL 150mg tabs for her 450mg daily dose  omeprazole (PRILOSEC) 40 MG delayed release capsule, Take 40 mg by mouth 2 times daily   fenofibrate (TRICOR) 145 MG tablet, Take 145 mg by mouth daily  gabapentin (NEURONTIN) 600 MG tablet, Take 600 mg by mouth 4 times daily  FLUoxetine HCl (PROZAC PO), Take 60 mg by mouth daily Takes 40mg + 20mg capsules for her 60mg daily dose  clonazePAM (KLONOPIN) 0.5 MG tablet, Take 0.5 mg by mouth 3 times daily as needed for Anxiety.  .  cetirizine (ZYRTEC) 5 MG tablet, Take 5 mg by mouth daily  Cholecalciferol (VITAMIN D) 2000 units CAPS capsule, Take 2,000 Units by mouth daily   rOPINIRole (REQUIP) 0.25 MG tablet, Take 0.25 mg by mouth nightly   atorvastatin (LIPITOR) 80 MG tablet, Take 80 mg by mouth daily  zolpidem (AMBIEN) 10 MG tablet, Take by mouth nightly as needed for Sleep .  furosemide (LASIX) 20 MG tablet, Take 20 mg by mouth daily

## 2018-07-26 NOTE — PROGRESS NOTES
Physical Therapy    Facility/Department: [facility]  Initial Assessment    NAME: J Carlos Butt  : 1960  MRN: [id number]    Date of Service: 2018    Discharge Recommendations:  Home with assist PRN      Per neurology reports HISTORY OF PRESENT ILLNESS:  This patient is a 59-year-old female whom I am  asked to see in Neurology consultation for advice and opinion regarding  seizures. The patient states that she has not been feeling well recently. She was at a restaurant when she felt very ill and developed shaking,  jerking movements with poor responsiveness with postictal confusion,  fatigue, and headache. She did not bite her tongue. She did not lose  control of her bladder or bowel. She had no preceding aura. She has had  no recent head injuries. She has had no history of meningitis or  encephalitis. She has never had a seizure before. There is no family  history of seizures. She states that she has been feeling ill for some  time. She did turn blue and was \"foaming at the mouth. \"  The episode  lasted for about a minute. She does not remember anything about that. Patient Diagnosis(es): The encounter diagnosis was Seizure-like activity ([address] has a past medical history of Arthritis; Asthma; Depression; Diabetes mellitus ([address] Hyperlipidemia; and Thyroid disease. has a past surgical history that includes Hysterectomy; sinus surgery; Thyroidectomy, partial (Left); and Colonoscopy.     Restrictions  Restrictions/Precautions  Restrictions/Precautions: General Precautions, Fall Risk, Seizure  Position Activity Restriction  Other position/activity restrictions: NPO, up with assist  Vision/Hearing  Vision: Impaired  Vision Exceptions: Wears glasses at all times  Hearing: Within functional limits     Subjective  General  Chart Reviewed: Yes  Patient assessed for rehabilitation services?: Yes  Additional Pertinent Hx: OA, asthma, DM  Response To Previous Treatment: Not applicable  Family / Caregiver Present: No  Diagnosis: seizure  Follows Commands: Within Functional Limits  General Comment  Comments: Flory Al RN reports patient appropriate for PT. Subjective  Subjective: Patient pleasant and agreeable to PT. Pain Screening  Patient Currently in Pain: Yes  Pain Assessment  Pain Assessment: 0-10  Pain Level: 9  Pain Type: Acute pain  Pain Location: Head (left side of head)  Pain Intervention(s): Medication (see eMar);Repositioned  Response to Pain Intervention: Patient Satisfied  Vital Signs  Patient Currently in Pain: Yes  Pre Treatment Pain Screening  Intervention List: Patient able to continue with treatment    Orientation  Orientation  Overall Orientation Status: Within Normal Limits    Social/Functional History  Social/Functional History  Lives With: Spouse (Son 28)  Type of Home: House  Home Layout: One level  Home Access: Stairs to enter without rails  Entrance Stairs - Number of Steps: 1  Bathroom Shower/Tub: Tub/Shower unit  Bathroom Toilet: Standard  Bathroom Equipment:  (None)  Home Equipment:  (Has prescription for cane)  ADL Assistance: Independent  Homemaking Assistance: Independent  Ambulation Assistance: Independent (has been having trouble walking for a few months)  Transfer Assistance: Independent  Active : Yes  Mode of Transportation: Car  Occupation: Retired  Additional Comments: Baker cyst removed and meniscus repair 2-3 years ago, Has had some leg weakness, throbbing, and altered sensation mostly left leg and right gluteal for several months. Got script for cane from PCP, but has not filled. Was scheduled to see neurologist next week. Patient had 2 fall last few weeks when lost balance, 2 near fall.   Objective    AROM RLE (degrees)  RLE AROM: WFL  AROM LLE (degrees)  LLE AROM : WFL  AROM RUE (degrees)  RUE AROM : WFL  AROM LUE (degrees)  LUE AROM : WFL  Strength RLE  Comment: 4+/5  Strength LLE  Comment: 4+/5  Strength RUE  Comment: 4+/5  Strength LUE  Comment: 4+/5  Tone RLE  RLE Tone: Normotonic  Tone LLE  LLE Tone: Normotonic  Motor Control  Gross Motor?: WFL  Sensation  Overall Sensation Status: Impaired (Pain and decreased sensation all the way down back of left leg to foot, acrossed back to right gluteal)  Bed mobility  Bridging: Supervision  Rolling to Left: Supervision  Rolling to Right: Supervision  Supine to Sit: Supervision  Sit to Supine: Supervision  Scooting: Supervision  Transfers  Sit to Stand: Supervision  Stand to sit: Supervision  Bed to Chair: Supervision  Stand Pivot Transfers: Supervision  Squat Pivot Transfers: Supervision  Lateral Transfers: Supervision  Ambulation  Ambulation?: Yes  Ambulation 1  Surface: level tile  Device: Rolling Walker  Assistance: Supervision  Quality of Gait: Patient steady, but wide CARLOS, cautious with ambulation due to left LE numbness and pain  Distance: 100 feet     Balance  Posture: Fair  Sitting - Static: Good  Sitting - Dynamic: Good  Standing - Static: Good;-  Standing - Dynamic: Good;-        Assessment   Body structures, Functions, Activity limitations: Decreased functional mobility ; Decreased strength;Decreased balance;Decreased endurance  Assessment: Patient reports some difficulty with ambulation for several month with N/T down her left leg, has had 2 falls. Patient steady with gait on eval, but increased lateral sway when standing with narrow CARLOS. Prognosis: Good  Decision Making: Low Complexity  History: Seizure, OA. DM  Exam: ROM, strength, balance, gait, [place] AM PAC  Clinical Presentation: evolving  Patient Education: Patient educated to PT POC and fall prevention.   REQUIRES PT FOLLOW UP: Yes  Activity Tolerance  Activity Tolerance: Patient Tolerated treatment well         Plan   Plan  Times per week: 1x/ day, 5-6 d/wk  Current Treatment Recommendations: Strengthening, Balance Training, Functional Mobility Training, Transfer Training, Gait Training, Home Exercise Program, Safety Education & Training,

## 2018-07-26 NOTE — FLOWSHEET NOTE
The patient was in testing.  The writer gave a silent prayer of healing and comfort.     07/26/18 1795   Encounter Summary   Services provided to: Patient   Referral/Consult From: Rounding   Continue Visiting (7/26/2018)   Complexity of Encounter Low   Length of Encounter 15 minutes   Routine   Type Initial   Assessment (Having test completed)   Intervention Prayer

## 2018-07-26 NOTE — ED NOTES
Pt arrived to ED via EMS with c/o seizure like activity that happened while the pt was out to dinner. Pt family states the pt tensed up and had some shaking happen while the pt was at the table. Pt does not remember the incident but states that prior to the incident, she was standing at the buffet line and felt like she was going to faint. Family denies loss of consciousness. Pt denies loss of bowels or urinary control. Pt has no history of Seizures. Pt states she has noticed an increase in her forgetting things. Pt states this has been happening more over the last 4-6 months. Pt appears anxious and speech is quick but organized. No facial droop noted. Bilateral  equal. Bilateral push/pull tests equal. Pt states headache with pain across the front of her head. Pt denies chest pain. Pt denies shortness of breath. Respirations non labored. Skin warm and dry. Skin color appropriate to race. Pt A&Ox4.       Cayden Gonsalves RN  07/25/18 5931

## 2018-07-26 NOTE — H&P
has been evaluated by a neurologist in the past.  She does not know the name of the neurologist.  She does not know why she was evaluated\"    In the ED,     \"No evidence to suggest infective etiology. Investigations returned benign. I did discuss possibility polypharmacy with family. She is noted to be on Wellbutrin. I'm informed that she has not had her Klonopin filled in about a month. She is showing no further neurologic compromise no further seizure-like activity. She never did experience some sort of significant event. Care is discussed with internal medicine to facilitate admission for further monitoring,evaluation and supportive care is indicated\"    Patient was admitted for further care. Past Medical History:     Past Medical History:   Diagnosis Date    Arthritis     Asthma     Depression     Diabetes mellitus ([facility] [address]     Hyperlipidemia     Thyroid disease         Past Surgical History:     Past Surgical History:   Procedure Laterality Date    COLONOSCOPY      HYSTERECTOMY      SINUS SURGERY      states X5 years ago    THYROIDECTOMY, PARTIAL Left         Medications Prior to Admission:     Prior to Admission medications    Medication Sig Start Date End Date Taking?  Authorizing Provider   levothyroxine (SYNTHROID) 125 MCG tablet Take 125 mcg by mouth Daily   Yes Historical Provider, MD   sertraline (ZOLOFT) 100 MG tablet Take 100 mg by mouth daily   Yes Historical Provider, MD   buPROPion (WELLBUTRIN XL) 300 MG extended release tablet Take 300 mg by mouth every morning   Yes Historical Provider, MD   omeprazole (PRILOSEC) 40 MG delayed release capsule Take 40 mg by mouth daily   Yes Historical Provider, MD   fenofibrate (TRICOR) 145 MG tablet Take 145 mg by mouth daily   Yes Historical Provider, MD   gabapentin (NEURONTIN) 600 MG tablet Take 600 mg by mouth 4 times daily   Yes Historical Provider, MD   FLUoxetine (PROZAC) 20 MG capsule Take 20 mg by mouth daily   Yes Historical Provider, MD   clonazePAM (KLONOPIN) 0.5 MG tablet Take 0.5 mg by mouth 2 times daily as needed . Yes Historical Provider, MD   cetirizine (ZYRTEC) 5 MG tablet Take 5 mg by mouth daily   Yes Historical Provider, MD   Cholecalciferol (VITAMIN D) 2000 units CAPS capsule Take by mouth   Yes Historical Provider, MD   rOPINIRole (REQUIP) 0.25 MG tablet Take 0.25 mg by mouth 3 times daily   Yes Historical Provider, MD   atorvastatin (LIPITOR) 80 MG tablet Take 80 mg by mouth daily   Yes Historical Provider, MD   zolpidem (AMBIEN) 10 MG tablet Take by mouth nightly as needed for Sleep . Yes Historical Provider, MD   furosemide (LASIX) 20 MG tablet Take 20 mg by mouth daily   Yes Historical Provider, MD        Allergies:     Amoxicillin; Asa [aspirin]; Ciprofloxacin; Keflex [cephalexin]; Macrobid [nitrofurantoin monohyd macro]; Metformin and related; Pcn [penicillins]; Quinine derivatives; Sulfa antibiotics; Tylenol with codeine #3 [acetaminophen-codeine]; Erythromycin; and Tape [adhesive tape]    Social History:     Tobacco:    reports that she is a non-smoker but has been exposed to tobacco smoke. She has never used smokeless tobacco.  Alcohol:      reports that she drinks about 0.6 oz of alcohol per week . Drug Use:  reports that she does not use drugs. Family History:     History reviewed. No pertinent family history. Review of Systems:     Negative except for those highlighted:    CONSTITUTIONAL:  fevers, chills, sweats, fatigue, weight loss, generalized weakness  HEENT:  Vision changes, hearing changes, runny nose, throat pain  RESPIRATORY:  shortness of breath, cough, congestion, wheezing.   CARDIOVASCULAR:  chest pain, palpitations  GASTROINTESTINAL:  nausea, vomiting, diarrhea, constipation, change in bowel habits, abdominal pain   GENITOURINARY:  difficulty of urination, burning with urination, frequency   INTEGUMENT:  rash, skin lesions, easy bruising   HEMATOLOGIC/LYMPHATIC:  swelling/edema ALLERGIC/IMMUNOLOGIC:  urticaria , itching  ENDOCRINE:  increase in drinking, increase in urination, hot or cold intolerance  MUSCULOSKELETAL:  joint pains, muscle aches, swelling of joints  NEUROLOGICAL:  headaches, dizziness, lightheadedness, numbness, pain, tingling extremities  BEHAVIOR/PSYCH:  depression, anxiety    Physical Exam:   /74   Pulse 80   Temp 98.1 °F (36.7 °C) (Oral)   Resp 16   Ht 5' (1.524 m)   Wt 164 lb (74.4 kg)   SpO2 99%   BMI 32.03 kg/m²   Temp (24hrs), Av.1 °F (36.7 °C), Min:97.5 °F (36.4 °C), Max:99 °F (37.2 °C)    Recent Labs      188  18   0626   POCGLU  208*  129*       Intake/Output Summary (Last 24 hours) at 18 0800  Last data filed at 18 7137   Gross per 24 hour   Intake              356 ml   Output                0 ml   Net              356 ml       General Appearance:  alert, well appearing, and in no acute distress  Mental status: oriented to person, place, and time with normal affect  Head:  normocephalic, atraumatic. Eye: no icterus, redness, pupils equal and reactive, extraocular eye movements intact, conjunctiva clear  Ear: normal external ear, no discharge, hearing intact  Nose:  no drainage noted  Mouth: mucous membranes moist  Neck: supple, no carotid bruits, thyroid not palpable  Lungs: Bilateral equal air entry, clear to ausculation, no wheezing, rales or rhonchi, normal effort  Cardiovascular: normal rate, regular rhythm, no murmur, gallop, rub.   Abdomen: Soft, nontender, nondistended, normal bowel sounds, no hepatomegaly or splenomegaly  Neurologic: There are no new focal motor or sensory deficits, normal muscle tone and bulk, no abnormal sensation, normal speech, cranial nerves II through XII grossly intact  Skin: No gross lesions, rashes, bruising or bleeding on exposed skin area  Extremities:  peripheral pulses palpable, no pedal edema or calf pain with palpation  Psych: normal affect    Investigations:      Laboratory  Chronic pain disorder [G89.4] 10/25/2017    Acquired hypothyroidism [E03.9] 08/24/2016    Class 1 obesity due to excess calories with serious comorbidity and body mass index (BMI) of 33.0 to 33.9 in adult [E66.09, Z68.33] 08/24/2016    Dyslipidemia [E78.5] 08/24/2016    Essential hypertension [I10] 08/24/2016    Gastroesophageal reflux disease [K21.9] 08/24/2016    Type 2 diabetes mellitus with diabetic peripheral angiopathy without gangrene, with long-term current use of insulin ([facility] [address] [E11.51, Z79.4] 08/24/2016       Plan:     Patient status Admit as inpatient in the  Med/Surge    Principal Problem:    Seizure ([address]  Active Problems:    Acquired hypothyroidism    Chronic pain disorder    Class 1 obesity due to excess calories with serious comorbidity and body mass index (BMI) of 33.0 to 33.9 in adult    Degeneration of lumbar intervertebral disc    Depression with anxiety    Dyslipidemia    Essential hypertension    Gastroesophageal reflux disease    Type 2 diabetes mellitus with diabetic peripheral angiopathy without gangrene, with long-term current use of insulin ([address]  Resolved Problems:    * No resolved hospital problems. *      Seizures. Seizure precautions. Neurology consulted. EEG, MRI pending. DM: Insulin sliding scale, Hypoglycemia protocol, Resume home doses of antidiabetic medications, diabetic diet once patient is not NPO. HTN: stable,resume home meds    Check Electrolytes and Electrolytes replacement as needed     DVT prophylaxis: Lovenox 40 mg SC    PT, OT as needed.       IV Fluids: sodium chloride Last Rate: 75 mL/hr at 07/26/18 0432,    Nutrition:  Diet NPO Effective Now      Consultations:   IP CONSULT TO INTERNAL MEDICINE  IP CONSULT TO NEUROLOGY    Patient is admitted as inpatient status because of co-morbidities listed above, severity of signs and symptoms as outlined, requirement for current medical therapies and most importantly because of direct risk to patient if care not

## 2018-07-26 NOTE — ED PROVIDER NOTES
[address] [place] [address] ED  eMERGENCY dEPARTMENT eNCOUnter      Pt Name: Brigid Zafar  MRN: [id number]  [place] 1960  Date of evaluation: 7/25/2018  Provider: Yanet Lowery MD    [address] [place] [address]       Chief Complaint   Patient presents with    Dizziness    Seizures     maybe anxiety- doesn't remember episode but not post ictal          HISTORY OF PRESENT ILLNESS  (Location/Symptom, Timing/Onset, Context/Setting, Quality, Duration, Modifying Factors, Severity.)   Brigid Zafar is a 62 y.o. female who presents to the emergency department For evaluation of seizure-like activity. Patient was dining out at a [employer]. She remembers walking back to the table. Family member states she sat down at the table. Shortly after sitting down and her eyes rolled back in her head and she experienced generalized tremors. Her  endorses that she \"turned blue\". She states she was \"foaming at the mouth\". She had no incontinence. This episode lasted about a minute. Paramedics were called and she is subsequently transferred here. Patient has no memory of the events. She denies previous history of seizure disorder. She states she is always off balance and she has chronic dizziness. She states she's been taking all of her usual medications. She denies any mis-dosing of her medications. She denies any new exposures. She has had no recent illness. She denies neck pain. No fevers chills noted. She has had mild nausea. No vomiting or diarrhea symptoms. She has been evaluated by a neurologist in the past.  She does not know the name of the neurologist.  She does not know why she was evaluated      Nursing Notes were reviewed. ALLERGIES     Amoxicillin; Asa [aspirin]; Ciprofloxacin; Keflex [cephalexin]; Macrobid [nitrofurantoin monohyd macro]; Metformin and related; Pcn [penicillins]; Quinine derivatives; Sulfa antibiotics; Tylenol with codeine #3 [acetaminophen-codeine];  Erythromycin; and Tape Radiologists     Read Date Phone Pager   [facility] Jul 25, 2018 [phone]    Radiation Dose Estimates     No radiation information found for this patient   Narrative   EXAMINATION:   CT OF THE HEAD WITHOUT CONTRAST  7/25/2018 9:47 pm       TECHNIQUE:   CT of the head was performed without the administration of intravenous   contrast. Dose modulation, iterative reconstruction, and/or weight based   adjustment of the mA/kV was utilized to reduce the radiation dose to as low   as reasonably achievable.       COMPARISON:   None.       HISTORY:   ORDERING SYSTEM PROVIDED HISTORY: Stroke symptoms   TECHNOLOGIST PROVIDED HISTORY:   Has a \"code stroke\" or \"stroke alert\" been called? ->Yes       FINDINGS:   BRAIN/VENTRICLES: There is no acute intracranial hemorrhage, mass effect or   midline shift.  No abnormal extra-axial fluid collection.  The gray-white   differentiation is maintained without evidence of an acute infarct.  There is   no evidence of hydrocephalus. Mild periventricular and subcortical white   matter low attenuation probably represents sequela of mild chronic small   vessel ischemic change.       ORBITS: The visualized portion of the orbits demonstrate no acute abnormality.       SINUSES: The visualized paranasal sinuses and mastoid air cells demonstrate   no acute abnormality.       SOFT TISSUES/SKULL:  No acute abnormality of the visualized skull or soft   tissues.           Impression   No acute intracranial abnormality.       Findings were discussed with DR. Kim Jones At 9:57 pm on 7/25/2018.      XR CHEST PORTABLE   Status: Final result   Order Providers     Authorizing Billing   Illa Favorite, MD Antoinette Siemens, MD          Signed by     Signed Date/Time  Phone Pager   [facility] 7/25/2018 22:10 [phone]    Reading Radiologists     Read Date Phone Pager   [facility] Jul 25, 2018 [phone]    Radiation Dose Estimates     No radiation within normal limits   URINE CULTURE   MAGNESIUM   CK ISOENZYMES   AMMONIA   URINALYSIS   URINE DRUG SCREEN       All other labs were within normal range or not returned as of this dictation. EMERGENCY DEPARTMENT COURSE and DIFFERENTIAL DIAGNOSIS/MDM:   Vitals:    Vitals:    07/25/18 2125   BP: (!) 145/35   Pulse: 100   Resp: 20   Temp: 99 °F (37.2 °C)   TempSrc: Oral   SpO2: 100%   Weight: 160 lb (72.6 kg)   Height: 5' (1.524 m)     Patient is evaluated. Her previous records are reviewed. She's remained quite stable since arrival.  No evidence to suggest infective etiology. Investigations returned benign. I did discuss possibility polypharmacy with family. She is noted to be on Wellbutrin. I'm informed that she has not had her Klonopin filled in about a month. She is showing no further neurologic compromise no further seizure-like activity. She never did experience some sort of significant event. Care is discussed with internal medicine to facilitate admission for further monitoring,evaluation and supportive care is indicated    CONSULTS:  IP CONSULT TO INTERNAL MEDICINE    PROCEDURES:  None    FINAL IMPRESSION      1. Seizure-like activity [facility])          DISPOSITION/PLAN   DISPOSITION Decision To Admit 07/25/2018 11:18:47 PM      PATIENT REFERRED TO:   No follow-up provider specified. DISCHARGE MEDICATIONS:     New Prescriptions    No medications on file     Controlled Substances Monitoring:     RX Monitoring 7/25/2018   Attestation The Prescription Monitoring Report for this patient was reviewed today. Documentation Possible medication side effects, risk of tolerance/dependence & alternative treatments discussed.      (Please note that portions of this note were completed with a voice recognition program.  Efforts were made to edit the dictations but occasionally words are mis-transcribed.)    Freddie Saravia MD  Attending Emergency Physician         Freddie Saravia MD  07/26/18 [id number] [facility]

## 2018-07-27 VITALS
HEIGHT: 60 IN | OXYGEN SATURATION: 97 % | TEMPERATURE: 97.9 F | DIASTOLIC BLOOD PRESSURE: 86 MMHG | HEART RATE: 79 BPM | SYSTOLIC BLOOD PRESSURE: 143 MMHG | RESPIRATION RATE: 14 BRPM | BODY MASS INDEX: 32.3 KG/M2 | WEIGHT: 164.5 LBS

## 2018-07-27 LAB
ALBUMIN SERPL-MCNC: 3.9 G/DL (ref 3.5–5.2)
ALBUMIN/GLOBULIN RATIO: ABNORMAL (ref 1–2.5)
ALP BLD-CCNC: 75 U/L (ref 35–104)
ALT SERPL-CCNC: 31 U/L (ref 5–33)
ANION GAP SERPL CALCULATED.3IONS-SCNC: 10 MMOL/L (ref 9–17)
AST SERPL-CCNC: 42 U/L
BILIRUB SERPL-MCNC: 0.45 MG/DL (ref 0.3–1.2)
BUN BLDV-MCNC: 20 MG/DL (ref 6–20)
BUN/CREAT BLD: 16 (ref 9–20)
CALCIUM SERPL-MCNC: 9 MG/DL (ref 8.6–10.4)
CHLORIDE BLD-SCNC: 105 MMOL/L (ref 98–107)
CO2: 26 MMOL/L (ref 20–31)
CREAT SERPL-MCNC: 1.29 MG/DL (ref 0.5–0.9)
CULTURE: NORMAL
GFR AFRICAN AMERICAN: 51 ML/MIN
GFR NON-AFRICAN AMERICAN: 42 ML/MIN
GFR SERPL CREATININE-BSD FRML MDRD: ABNORMAL ML/MIN/{1.73_M2}
GFR SERPL CREATININE-BSD FRML MDRD: ABNORMAL ML/MIN/{1.73_M2}
GLUCOSE BLD-MCNC: 184 MG/DL (ref 65–105)
GLUCOSE BLD-MCNC: 88 MG/DL (ref 70–99)
GLUCOSE BLD-MCNC: 89 MG/DL (ref 65–105)
HCT VFR BLD CALC: 38.2 % (ref 36–46)
HEMOGLOBIN: 12.8 G/DL (ref 12–16)
INR BLD: 1.1
Lab: NORMAL
MCH RBC QN AUTO: 28.5 PG (ref 26–34)
MCHC RBC AUTO-ENTMCNC: 33.4 G/DL (ref 31–37)
MCV RBC AUTO: 85.2 FL (ref 80–100)
NRBC AUTOMATED: NORMAL PER 100 WBC
PDW BLD-RTO: 14.3 % (ref 11.5–14.5)
PLATELET # BLD: 190 K/UL (ref 130–400)
PMV BLD AUTO: 8.6 FL (ref 6–12)
POTASSIUM SERPL-SCNC: 3.8 MMOL/L (ref 3.7–5.3)
PROTHROMBIN TIME: 11.1 SEC (ref 9.7–11.6)
RBC # BLD: 4.49 M/UL (ref 4–5.2)
SODIUM BLD-SCNC: 141 MMOL/L (ref 135–144)
SPECIMEN DESCRIPTION: NORMAL
STATUS: NORMAL
TOTAL PROTEIN: 6.2 G/DL (ref 6.4–8.3)
WBC # BLD: 6.1 K/UL (ref 3.5–11)

## 2018-07-27 PROCEDURE — 2580000003 HC RX 258: Performed by: NURSE PRACTITIONER

## 2018-07-27 PROCEDURE — 85610 PROTHROMBIN TIME: CPT

## 2018-07-27 PROCEDURE — 97165 OT EVAL LOW COMPLEX 30 MIN: CPT

## 2018-07-27 PROCEDURE — 97535 SELF CARE MNGMENT TRAINING: CPT

## 2018-07-27 PROCEDURE — 80053 COMPREHEN METABOLIC PANEL: CPT

## 2018-07-27 PROCEDURE — 36415 COLL VENOUS BLD VENIPUNCTURE: CPT

## 2018-07-27 PROCEDURE — 6360000002 HC RX W HCPCS: Performed by: NURSE PRACTITIONER

## 2018-07-27 PROCEDURE — 97116 GAIT TRAINING THERAPY: CPT

## 2018-07-27 PROCEDURE — 82947 ASSAY GLUCOSE BLOOD QUANT: CPT

## 2018-07-27 PROCEDURE — G8987 SELF CARE CURRENT STATUS: HCPCS

## 2018-07-27 PROCEDURE — 97110 THERAPEUTIC EXERCISES: CPT

## 2018-07-27 PROCEDURE — 94761 N-INVAS EAR/PLS OXIMETRY MLT: CPT

## 2018-07-27 PROCEDURE — 6370000000 HC RX 637 (ALT 250 FOR IP): Performed by: INTERNAL MEDICINE

## 2018-07-27 PROCEDURE — 6370000000 HC RX 637 (ALT 250 FOR IP): Performed by: NURSE PRACTITIONER

## 2018-07-27 PROCEDURE — 99239 HOSP IP/OBS DSCHRG MGMT >30: CPT | Performed by: INTERNAL MEDICINE

## 2018-07-27 PROCEDURE — G8988 SELF CARE GOAL STATUS: HCPCS

## 2018-07-27 PROCEDURE — 85027 COMPLETE CBC AUTOMATED: CPT

## 2018-07-27 RX ADMIN — FUROSEMIDE 20 MG: 20 TABLET ORAL at 09:56

## 2018-07-27 RX ADMIN — ENOXAPARIN SODIUM 40 MG: 40 INJECTION SUBCUTANEOUS at 09:56

## 2018-07-27 RX ADMIN — INSULIN LISPRO 2 UNITS: 100 INJECTION, SOLUTION INTRAVENOUS; SUBCUTANEOUS at 13:01

## 2018-07-27 RX ADMIN — KETOROLAC TROMETHAMINE 15 MG: 15 INJECTION, SOLUTION INTRAMUSCULAR; INTRAVENOUS at 04:19

## 2018-07-27 RX ADMIN — LINAGLIPTIN 5 MG: 5 TABLET, FILM COATED ORAL at 09:56

## 2018-07-27 RX ADMIN — GABAPENTIN 600 MG: 300 CAPSULE ORAL at 09:56

## 2018-07-27 RX ADMIN — FLUOXETINE 60 MG: 20 CAPSULE ORAL at 09:56

## 2018-07-27 RX ADMIN — PANTOPRAZOLE SODIUM 40 MG: 40 TABLET, DELAYED RELEASE ORAL at 06:01

## 2018-07-27 RX ADMIN — LEVOTHYROXINE SODIUM 125 MCG: 125 TABLET ORAL at 06:01

## 2018-07-27 RX ADMIN — FENOFIBRATE 160 MG: 160 TABLET ORAL at 09:57

## 2018-07-27 RX ADMIN — KETOROLAC TROMETHAMINE 15 MG: 15 INJECTION, SOLUTION INTRAMUSCULAR; INTRAVENOUS at 13:02

## 2018-07-27 RX ADMIN — SODIUM CHLORIDE: 9 INJECTION, SOLUTION INTRAVENOUS at 13:06

## 2018-07-27 ASSESSMENT — PAIN DESCRIPTION - PROGRESSION: CLINICAL_PROGRESSION: GRADUALLY IMPROVING

## 2018-07-27 ASSESSMENT — PAIN DESCRIPTION - LOCATION
LOCATION: HEAD
LOCATION: HEAD

## 2018-07-27 ASSESSMENT — PAIN DESCRIPTION - ONSET: ONSET: ON-GOING

## 2018-07-27 ASSESSMENT — PAIN SCALES - GENERAL
PAINLEVEL_OUTOF10: 5
PAINLEVEL_OUTOF10: 5
PAINLEVEL_OUTOF10: 7
PAINLEVEL_OUTOF10: 6

## 2018-07-27 ASSESSMENT — PAIN DESCRIPTION - DESCRIPTORS: DESCRIPTORS: HEADACHE

## 2018-07-27 ASSESSMENT — PAIN DESCRIPTION - FREQUENCY: FREQUENCY: INTERMITTENT

## 2018-07-27 ASSESSMENT — PAIN DESCRIPTION - PAIN TYPE: TYPE: ACUTE PAIN

## 2018-07-27 NOTE — PROGRESS NOTES
Normotonic  Tone LUE  LUE Tone: Normotonic  Coordination  Movements Are Fluid And Coordinated: Yes     Bed mobility  Bridging: Supervision  Rolling to Right: Supervision  Supine to Sit: Supervision  Sit to Supine: Supervision  Scooting: Supervision  Transfers  Sit to stand: Supervision  Stand to sit: Supervision     Cognition  Overall Cognitive Status: WFL  Perception  Overall Perceptual Status: WFL     Sensation  Overall Sensation Status: Impaired        LUE AROM (degrees)  LUE AROM : WFL  RUE AROM (degrees)  RUE AROM : WFL  LUE Strength  Gross LUE Strength: WFL (B  UE's 5/5)  RUE Strength  Gross RUE Strength: WFL                  Assessment   Performance deficits / Impairments: Decreased functional mobility ; Decreased ADL status; Decreased strength;Decreased safe awareness;Decreased endurance;Decreased balance  Prognosis: Good  Decision Making: Low Complexity  Patient Education: OT POC, handout on fall prevention, safety, and EC/WS given throughout ADL session. REQUIRES OT FOLLOW UP: Yes  Activity Tolerance  Activity Tolerance: Patient Tolerated treatment well  Safety Devices  Safety Devices in place: Yes  Type of devices: Call light within reach;Nurse notified; Left in chair;Gait belt;Patient at risk for falls; Chair alarm in place         Plan   Plan  Times per week: 4x/week  Current Treatment Recommendations: Balance Training, Functional Mobility Training, Endurance Training, Safety Education & Training, Self-Care / ADL, Patient/Caregiver Education & Training, Equipment Evaluation, Education, & procurement    G-Code  OT G-codes  Functional Assessment Tool Used: [employer] \"6 clicks\" Inpatient daily activity short form  Score: 19  Functional Limitation: Self care  Self Care Current Status ():  At least 20 percent but less than 40 percent impaired, limited or restricted  Self Care Goal Status (): 0 percent impaired, limited or restricted  OutComes Score                                           AM-PAC Score AM-PAC Inpatient Daily Activity Raw Score: 20  AM-PAC Inpatient ADL T-Scale Score : 42.03  ADL Inpatient CMS 0-100% Score: 38.32  ADL Inpatient CMS G-Code Modifier : CJ    Goals  Short term goals  Time Frame for Short term goals: by discharge, pt will  Short term goal 1: demo I/S with all ADL completion with good safety awareness   Short term goal 2: demo I/S with functional mob for safe ADL completion  Short term goal 3: verb good understanding of fall prevention techs and possible equip needs for home  Patient Goals   Patient goals : to go home       Therapy Time   Individual Concurrent Group Co-treatment   Time In 1041 (+10 min chart review)         Time Out 1130         Minutes [address] [place] [address]

## 2018-07-27 NOTE — DISCHARGE INSTR - DIET

## 2018-07-27 NOTE — PROGRESS NOTES
anxiety    Medications: Allergies: Allergies   Allergen Reactions    Amoxicillin     Asa [Aspirin]     Ciprofloxacin     Keflex [Cephalexin]     Macrobid [Nitrofurantoin Monohyd Macro]     Metformin And Related     Pcn [Penicillins]     Quinine Derivatives     Sulfa Antibiotics     Tylenol With Codeine #3 [Acetaminophen-Codeine]     Erythromycin Nausea And Vomiting    Tape Sheba Ahumada Tape] Rash     Paper tape       Current Meds:   Scheduled Meds:    atorvastatin  80 mg Oral Daily    fenofibrate  160 mg Oral Daily    furosemide  20 mg Oral Daily    levothyroxine  125 mcg Oral Daily    sodium chloride flush  10 mL Intravenous 2 times per day    enoxaparin  40 mg Subcutaneous Daily    ketorolac  15 mg Intravenous Q6H    insulin lispro  0-12 Units Subcutaneous TID WC    insulin lispro  0-6 Units Subcutaneous Nightly    rOPINIRole  0.25 mg Oral Nightly    gabapentin  600 mg Oral BID    FLUoxetine  60 mg Oral Daily    pantoprazole  40 mg Oral BID AC    famotidine  20 mg Oral Nightly    linagliptin  5 mg Oral Daily     Continuous Infusions:    sodium chloride 75 mL/hr at 07/26/18 2244    dextrose       PRN Meds: sodium chloride flush, magnesium hydroxide, bisacodyl, nicotine, LORazepam, acetaminophen, ondansetron **OR** ondansetron, glucose, dextrose, glucagon (rDNA), dextrose    Data:     Past Medical History:   has a past medical history of Arthritis; Asthma; Depression; Diabetes mellitus ([address] Hyperlipidemia; and Thyroid disease. Social History:   reports that she is a non-smoker but has been exposed to tobacco smoke. She has never used smokeless tobacco. She reports that she drinks about 0.6 oz of alcohol per week . She reports that she does not use drugs. Family History: History reviewed. No pertinent family history.     Vitals:  BP (!) 143/86   Pulse 79   Temp 97.9 °F (36.6 °C) (Oral)   Resp 14   Ht 5' (1.524 m)   Wt 164 lb 8 oz (74.6 kg)   SpO2 99%   BMI 32.13 kg/m² Temp (24hrs), Av.9 °F (36.6 °C), Min:97.7 °F (36.5 °C), Max:98.1 °F (36.7 °C)    Recent Labs      18   0608   POCGLU  82  143*  140*  89       I/O (24Hr):     Intake/Output Summary (Last 24 hours) at 18 09  Last data filed at 18 2442   Gross per 24 hour   Intake             1884 ml   Output             1000 ml   Net              884 ml       Labs:    Hematology:  Recent Labs      18   0611   WBC  10.4  6.1   RBC  5.24*  4.49   HGB  14.7  12.8   HCT  44.4  38.2   MCV  84.8  85.2   MCH  28.1  28.5   MCHC  33.1  33.4   RDW  14.3  14.3   PLT  254  190   MPV  8.4  8.6   INR   --   1.1     Chemistry:  Recent Labs      18   0949  18   0611   NA  140  141  141   K  3.5*  3.9  3.8   CL  98  104  105   CO2  23  26  26   GLUCOSE  213*  157*  88   BUN  19  25*  20   CREATININE  1.50*  1.38*  1.29*   MG  1.7   --    --    ANIONGAP  19*  11  10   LABGLOM  36*  39*  42*   GFRAA  43*  48*  51*   CALCIUM  10.3  9.3  9.0   TROPONINT  <0.03   --    --    CKTOTAL  82   --    --    CKMB  1.4   --    --    MYOGLOBIN  112*   --    --      Recent Labs      18   0626  18   0949  18   1113  18   17018   17518   0608  18   0611   PROT  7.3   --    --   6.3*   --    --    --    --    --   6.2*   LABALBU  4.5   --    --   4.2   --    --    --    --    --   3.9   TSH   --    --    --   0.21*   --    --    --    --    --    --    AST  44*   --    --   33*   --    --    --    --    --   42*   ALT  34*   --    --   29   --    --    --    --    --   31   ALKPHOS  89   --    --   77   --    --    --    --    --   75   BILITOT  0.35   --    --   0.32   --    --    --    --    --   0.45   BILIDIR  0.10   --    --   0.10   --    --    --    --    --    --    AMMONIA   --   44   --    --    --    --    --    --    --    --

## 2018-07-27 NOTE — PROGRESS NOTES
Physical Therapy  Facility/Department: [facility] MED SURG  Daily Treatment Note  NAME: Joshua Gutierrez  : 1960  MRN: [id number]    Date of Service: 2018    Discharge Recommendations:  Home with assist PRN        Patient Diagnosis(es): The encounter diagnosis was Seizure-like activity ([facility] [address] has a past medical history of Arthritis; Asthma; Depression; Diabetes mellitus ([facility] [address] Hyperlipidemia; and Thyroid disease. has a past surgical history that includes Hysterectomy; sinus surgery; Thyroidectomy, partial (Left); and Colonoscopy. Restrictions  Restrictions/Precautions  Restrictions/Precautions: General Precautions, Fall Risk, Seizure  Position Activity Restriction  Other position/activity restrictions: Up with assist  Subjective   General  Chart Reviewed: Yes  Additional Pertinent Hx: OA, asthma, DM  Family / Caregiver Present: No  Subjective  Subjective: Patient pleasant and agreeable to PT. General Comment  Comments: Juanita Sellers RN reports patient appropriate for PT. Orientation     Objective   Bed mobility  Rolling: Supervision   Supine to sit: Supervision  Sit to supine: Supervision  Bridging: Supervision  Scooting: Supervision  Transfers  Sit to Stand: Supervision  Stand to sit: Supervision  Bed to Chair: Supervision  Stand Pivot Transfers: Supervision  Squat Pivot Transfers: Supervision  Lateral Transfers: Supervision  Ambulation  Ambulation?: Yes  Ambulation 1  Surface: level tile  Device: Rolling Walker  Assistance: Supervision  Quality of Gait: steady with RW, note tremor/ shaking in hands  Distance: 200ft  Stairs/Curb  Stairs?: No     Balance  Posture: Good  Sitting - Static: Good  Sitting - Dynamic: Good  Standing - Static: Good;-  Standing - Dynamic: Good;-  Exercises  Comments: Reviewed seated LE exercises for ROM and circulation                        Assessment   Body structures, Functions, Activity limitations: Decreased functional mobility ; Decreased strength;Decreased balance;Decreased endurance  Assessment: Patient reports some difficulty with ambulation for several month with N/T down her left leg, has had 2 falls. Patient steady with gait on eval, but increased lateral sway when standing with narrow CARLOS. Recommend use of a walker or cane for safety during ambulation at this time. Prognosis: Good  Patient Education: Patient educated to PT POC and fall prevention. REQUIRES PT FOLLOW UP: Yes  Activity Tolerance  Activity Tolerance: Patient Tolerated treatment well       AM-PAC Score     AM-PAC Inpatient Mobility without Stair Climbing Raw Score : 18  AM-PAC Inpatient without Stair Climbing T-Scale Score : 51.97  Mobility Inpatient CMS 0-100% Score: 23.26  Mobility Inpatient without Stair CMS G-Code Modifier : CJ       Goals  Short term goals  Time Frame for Short term goals: 12 visits  Short term goal 1: Patient will be indep with be indep with bed mobility and transfers. Short term goal 2: Patient will amb 200 feet indep. Short term goal 3: Patient will have good standing balance. Short term goal 4: Patient will be indep with HEP.   Patient Goals   Patient goals : Return home    Plan    Plan  Times per week: 1x/ day, 5-6 d/wk  Current Treatment Recommendations: Strengthening, Balance Training, Functional Mobility Training, Transfer Training, Gait Training, Home Exercise Program, Safety Education & Training, Patient/Caregiver Education & Training  Safety Devices  Type of devices: Nurse notified, Left in bed, Gait belt, Call light within reach, All fall risk precautions in place, Bed alarm in place     Therapy Time   Individual Concurrent Group Co-treatment   Time In  1156         Time Out  1225         Minutes  [facility]  [address], [place]

## 2018-07-29 NOTE — DISCHARGE SUMMARY
[facility]    Discharge Summary     Patient ID: Mera Erwin  :  1960   MRN: [id number]     ACCOUNT:  [de-identified]   Patient's PCP: Antwon Stokes  Admit Date: 2018   Discharge Date: 18    Length of Stay: 1  Code Status:  Prior  Admitting Physician: Mayito Stein, DO  Discharge Physician: Niurka Lanier MD     Active Discharge Diagnoses:     Hospital Problem Lists:  Principal Problem:    Seizure ([facility] [address]  Active Problems:    Acquired hypothyroidism    Chronic pain disorder    Class 1 obesity due to excess calories with serious comorbidity and body mass index (BMI) of 33.0 to 33.9 in adult    Degeneration of lumbar intervertebral disc    Depression with anxiety    Dyslipidemia    Essential hypertension    Gastroesophageal reflux disease    Type 2 diabetes mellitus with diabetic peripheral angiopathy without gangrene, with long-term current use of insulin ([facility] [address]  Resolved Problems:    * No resolved hospital problems. *      Admission Condition:  poor     Discharged Condition: good    Hospital Stay:     Hospital Course:  Mera Erwin is a 62 y.o. female who was admitted for the management of   Seizure [facility]) , presented to ER with Dizziness and Seizures (maybe anxiety- doesn't remember episode but not post ictal )    The patient is a 62 y. o.  Non-/non  female who presents with Dizziness and Seizures (maybe anxiety- doesn't remember episode but not post ictal )   and she is admitted to the hospital for the management of Seizures.     She has the following significant co-morbidities: DM2, GERD, Hypothyroidism, Bipolar Disorder, Chronic Pain, DDD Spine.      She presented with the following:      \"evaluation of seizure-like activity.  Patient was dining out at a [employer]. Capri Liu remembers walking back to the table.  Family member states she sat down at the table.  Shortly after sitting down and her eyes rolled back in her head and she experienced generalized tremors. Aly Red  endorses that she \"turned blue\".  She states she was \"foaming at the [employer] had no incontinence.  This episode lasted about a minute.  Paramedics were called and she is subsequently transferred here. Krista Hill has no memory of the events.  She denies previous history of seizure disorder.  She states she is always off balance and she has chronic dizziness.  She states she's been taking all of her usual medications.  She denies any mis-dosing of her medications.  She denies any new exposures.  She has had no recent illness.  She denies neck pain.  No fevers chills noted. Vikki Decker has had mild nausea.  No vomiting or diarrhea symptoms.  She has been evaluated by a neurologist in the past.  She does not know the name of the neurologist. Vikki Decker does not know why she was evaluated\"     In the ED,      \"No evidence to suggest infective etiology.  Investigations returned benign.  I did discuss possibility polypharmacy with family. Vikki Decker is noted to be on Wellbutrin.  I'm informed that she has not had her Klonopin filled in about a month.  She is showing no further neurologic compromise no further seizure-like activity.  She never did experience some sort of significant event. [facility] is discussed with internal medicine to facilitate admission for further monitoring,evaluation and supportive care is indicated\"     Patient was admitted for further care. Significant therapeutic interventions:     Seizures. Seizure precautions. Neurology on board. Appreciate their help. Per their recs: \"1st time seizure.  Nothing in studies thus far indicate need for AED Rx.  Patient need sleep deprived EEG which can be done as an outpatient.  Has an appointment with Dr. Nalini Herring next week\"     DM:  Insulin sliding scale, Hypoglycemia protocol, Resume home doses of antidiabetic medications, diabetic diet once patient is not NPO.     HTN: stable,resume home meds     Check Electrolytes and Electrolytes replacement as needed      DVT prophylaxis: Lovenox 40 mg SC     PT, OT as needed.      Significant Diagnostic Studies:   Labs / Micro:  CBC:   Lab Results   Component Value Date    WBC 6.1 07/27/2018    RBC 4.49 07/27/2018    HGB 12.8 07/27/2018    HCT 38.2 07/27/2018    MCV 85.2 07/27/2018    MCH 28.5 07/27/2018    MCHC 33.4 07/27/2018    RDW 14.3 07/27/2018     07/27/2018     BMP:    Lab Results   Component Value Date    GLUCOSE 88 07/27/2018     07/27/2018    K 3.8 07/27/2018     07/27/2018    CO2 26 07/27/2018    ANIONGAP 10 07/27/2018    BUN 20 07/27/2018    CREATININE 1.29 07/27/2018    BUNCRER 16 07/27/2018    CALCIUM 9.0 07/27/2018    LABGLOM 42 07/27/2018    GFRAA 51 07/27/2018    GFR      07/27/2018    GFR NOT REPORTED 07/27/2018     HFP:    Lab Results   Component Value Date    PROT 6.2 07/27/2018     CMP:    Lab Results   Component Value Date    GLUCOSE 88 07/27/2018     07/27/2018    K 3.8 07/27/2018     07/27/2018    CO2 26 07/27/2018    BUN 20 07/27/2018    CREATININE 1.29 07/27/2018    ANIONGAP 10 07/27/2018    ALKPHOS 75 07/27/2018    ALT 31 07/27/2018    AST 42 07/27/2018    BILITOT 0.45 07/27/2018    LABALBU 3.9 07/27/2018    ALBUMIN NOT REPORTED 07/27/2018    LABGLOM 42 07/27/2018    GFRAA 51 07/27/2018    GFR      07/27/2018    GFR NOT REPORTED 07/27/2018    PROT 6.2 07/27/2018    CALCIUM 9.0 07/27/2018     PT/INR:  No results found for: PTINR  PTT: No results found for: APTT  FLP:  No results found for: CHOL, TRIG, HDL  U/A:    Lab Results   Component Value Date    COLORU YELLOW 07/26/2018    TURBIDITY CLEAR 07/26/2018    SPECGRAV 1.015 07/26/2018    HGBUR NEGATIVE 07/26/2018    PHUR 6.5 07/26/2018    PROTEINU NEGATIVE 07/26/2018    GLUCOSEU NEGATIVE 07/26/2018    KETUA NEGATIVE 07/26/2018    BILIRUBINUR NEGATIVE 07/26/2018    UROBILINOGEN Normal 07/26/2018    NITRU NEGATIVE 07/26/2018    LEUKOCYTESUR NEGATIVE 07/26/2018     TSH:    Lab Results

## 2018-08-25 PROBLEM — Z12.31 ENCOUNTER FOR SCREENING MAMMOGRAM FOR MALIGNANT NEOPLASM OF BREAST: Status: RESOLVED | Noted: 2017-03-10 | Resolved: 2018-08-25

## 2023-12-05 ENCOUNTER — OFFICE VISIT (OUTPATIENT)
Dept: NEUROLOGY | Age: 63
End: 2023-12-05
Payer: COMMERCIAL

## 2023-12-05 VITALS
HEART RATE: 97 BPM | WEIGHT: 127 LBS | DIASTOLIC BLOOD PRESSURE: 67 MMHG | HEIGHT: 60 IN | SYSTOLIC BLOOD PRESSURE: 92 MMHG | BODY MASS INDEX: 24.94 KG/M2

## 2023-12-05 DIAGNOSIS — G43.109 MIGRAINE WITH AURA AND WITHOUT STATUS MIGRAINOSUS, NOT INTRACTABLE: Primary | ICD-10-CM

## 2023-12-05 DIAGNOSIS — G40.209 COMPLEX PARTIAL SEIZURE DISORDER (HCC): ICD-10-CM

## 2023-12-05 PROCEDURE — 99204 OFFICE O/P NEW MOD 45 MIN: CPT | Performed by: PHYSICIAN ASSISTANT

## 2023-12-05 PROCEDURE — 3074F SYST BP LT 130 MM HG: CPT | Performed by: PHYSICIAN ASSISTANT

## 2023-12-05 PROCEDURE — 3078F DIAST BP <80 MM HG: CPT | Performed by: PHYSICIAN ASSISTANT

## 2023-12-05 RX ORDER — VENLAFAXINE HYDROCHLORIDE 150 MG/1
CAPSULE, EXTENDED RELEASE ORAL
COMMUNITY
Start: 2023-12-04

## 2023-12-05 RX ORDER — ERENUMAB-AOOE 140 MG/ML
INJECTION, SOLUTION SUBCUTANEOUS
COMMUNITY
Start: 2023-11-22

## 2023-12-05 RX ORDER — TRAZODONE HYDROCHLORIDE 50 MG/1
50 TABLET ORAL NIGHTLY
COMMUNITY
Start: 2023-10-16

## 2023-12-05 RX ORDER — RIMEGEPANT SULFATE 75 MG/75MG
TABLET, ORALLY DISINTEGRATING ORAL
COMMUNITY

## 2023-12-05 RX ORDER — LIDOCAINE 50 MG/G
1 PATCH TOPICAL DAILY
COMMUNITY

## 2023-12-05 RX ORDER — FREMANEZUMAB-VFRM 225 MG/1.5ML
225 INJECTION SUBCUTANEOUS
Qty: 1 ADJUSTABLE DOSE PRE-FILLED PEN SYRINGE | Refills: 5 | Status: SHIPPED | OUTPATIENT
Start: 2023-12-05

## 2023-12-05 RX ORDER — LISINOPRIL 2.5 MG/1
2.5 TABLET ORAL DAILY
COMMUNITY
Start: 2023-11-20

## 2023-12-05 RX ORDER — DAPAGLIFLOZIN 10 MG/1
TABLET, FILM COATED ORAL
COMMUNITY
Start: 2023-11-18

## 2023-12-05 RX ORDER — BUSPIRONE HYDROCHLORIDE 15 MG/1
15 TABLET ORAL 3 TIMES DAILY
COMMUNITY
Start: 2023-12-04

## 2023-12-05 RX ORDER — LANSOPRAZOLE 30 MG/1
CAPSULE, DELAYED RELEASE ORAL
COMMUNITY
Start: 2023-11-17

## 2023-12-05 RX ORDER — NALOXONE HYDROCHLORIDE 0.4 MG/ML
0.4 INJECTION, SOLUTION INTRAMUSCULAR; INTRAVENOUS; SUBCUTANEOUS PRN
COMMUNITY

## 2023-12-05 RX ORDER — PHENOL 1.4 %
AEROSOL, SPRAY (ML) MUCOUS MEMBRANE
COMMUNITY

## 2023-12-05 RX ORDER — ACETAMINOPHEN 500 MG
1000 TABLET ORAL EVERY 6 HOURS PRN
COMMUNITY

## 2023-12-08 ENCOUNTER — TELEPHONE (OUTPATIENT)
Dept: NEUROLOGY | Age: 63
End: 2023-12-08

## 2023-12-08 NOTE — TELEPHONE ENCOUNTER
Received a fax from pharmacy stating Kylie ARMENTA. This was  completed through [facility] .

## 2024-06-05 ENCOUNTER — OFFICE VISIT (OUTPATIENT)
Dept: NEUROLOGY | Age: 64
End: 2024-06-05
Payer: COMMERCIAL

## 2024-06-05 VITALS
BODY MASS INDEX: 25.13 KG/M2 | DIASTOLIC BLOOD PRESSURE: 67 MMHG | WEIGHT: 128 LBS | HEART RATE: 78 BPM | HEIGHT: 60 IN | SYSTOLIC BLOOD PRESSURE: 99 MMHG

## 2024-06-05 DIAGNOSIS — G40.209 COMPLEX PARTIAL SEIZURE DISORDER (HCC): ICD-10-CM

## 2024-06-05 DIAGNOSIS — G47.9 SLEEP DISTURBANCE: ICD-10-CM

## 2024-06-05 DIAGNOSIS — R53.83 OTHER FATIGUE: ICD-10-CM

## 2024-06-05 DIAGNOSIS — G43.109 MIGRAINE WITH AURA AND WITHOUT STATUS MIGRAINOSUS, NOT INTRACTABLE: Primary | ICD-10-CM

## 2024-06-05 PROCEDURE — 3078F DIAST BP <80 MM HG: CPT | Performed by: PHYSICIAN ASSISTANT

## 2024-06-05 PROCEDURE — 99214 OFFICE O/P EST MOD 30 MIN: CPT | Performed by: PHYSICIAN ASSISTANT

## 2024-06-05 PROCEDURE — 3074F SYST BP LT 130 MM HG: CPT | Performed by: PHYSICIAN ASSISTANT

## 2024-06-05 RX ORDER — FREMANEZUMAB-VFRM 225 MG/1.5ML
225 INJECTION SUBCUTANEOUS
Qty: 1 ADJUSTABLE DOSE PRE-FILLED PEN SYRINGE | Refills: 5 | Status: SHIPPED | OUTPATIENT
Start: 2024-06-05
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
## 2024-06-12 DIAGNOSIS — R53.83 OTHER FATIGUE: ICD-10-CM

## 2024-06-12 DIAGNOSIS — G47.9 SLEEP DISTURBANCE: ICD-10-CM

## 2024-06-12 DIAGNOSIS — G43.109 MIGRAINE WITH AURA AND WITHOUT STATUS MIGRAINOSUS, NOT INTRACTABLE: ICD-10-CM

## 2024-06-12 DIAGNOSIS — G40.209 COMPLEX PARTIAL SEIZURE DISORDER (HCC): ICD-10-CM

## 2024-06-15 ENCOUNTER — TELEPHONE (OUTPATIENT)
Dept: NEUROLOGY | Age: 64
End: 2024-06-15

## 2024-06-15 NOTE — TELEPHONE ENCOUNTER
I called and relayed the following result to Rebecca: \"Cr 1.9 - kidney function is poor. She takes a total 400 mg topiramate a day which is above recommended for current kidney function. I found a previous Cr which was similar. Is she following with nephrology and if so, have they mentioned having to adjust medications or is there plans for dialysis or other intervention?\"      Rebecca stated that she was last seen by [facility] on 10/18/2023. At that time she was teetering but there was no plan for intervention. She has an upcoming appointment with Dr. Gaviria this Friday (6/21/2024). I asked that she call after her appointment and let us know if there will be a plan at that time. She verbalized understanding.

## 2024-07-09 NOTE — TELEPHONE ENCOUNTER
I called Rebecca to follow up from her nephrology appointment. She stated that there was no concern on Dr. Everette pierson and that everything was fine. No change of medication or plan for dialysis at this time.     Rebecca stated that she has been having a hard time with bowel movements and was instructed to take two immodium every time eats along with benefiber daily. She noted that her appetite has increased. She had her first stool this morning, two hard ones. This was the only thing they changed at the appointment.

## 2024-07-10 NOTE — TELEPHONE ENCOUNTER
Rebecca stated that she previously had trouble with constipation which is why she was taking imodium. She is no longer having issues and stopped taking it about a week ago. Yesterday was her first bowel movements after stopping the medication and having diarrhea episodes.

## 2024-07-11 NOTE — TELEPHONE ENCOUNTER
Noted. If she calls back, please ensure she understands that imodium is taken for treatment of diarrhea and it will make constipation worse. From what I'm reading it seems like she was taking it to treat constipation.

## 2024-07-17 RX ORDER — FREMANEZUMAB-VFRM 225 MG/1.5ML
225 INJECTION SUBCUTANEOUS
Refills: 5 | OUTPATIENT
Start: 2024-07-17

## 2024-08-16 RX ORDER — RIMEGEPANT SULFATE 75 MG/75MG
75 TABLET, ORALLY DISINTEGRATING ORAL PRN
Qty: 30 TABLET | Refills: 5 | Status: SHIPPED | OUTPATIENT
Start: 2024-08-16

## 2024-08-16 RX ORDER — FREMANEZUMAB-VFRM 225 MG/1.5ML
225 INJECTION SUBCUTANEOUS
Qty: 1 ADJUSTABLE DOSE PRE-FILLED PEN SYRINGE | Refills: 5 | Status: SHIPPED | OUTPATIENT
Start: 2024-08-16

## 2024-08-16 NOTE — TELEPHONE ENCOUNTER
Please fill for Elizabeth as she is out of the office. Patient is switching from mail order pharmacy to local;    Pharmacy requesting refill of Ajovy 225mg/1.5mL.      Medication active on med list yes      Date of last Rx: 6/5/2024 with 5 refills          verified by MINOR BARTON    Pharmacy requesting refill of Nurtec 75mg.      Medication active on med list yes      Date of last Rx: 12/5/2023 with 0 refills          verified by MINOR BARTON      Date of last appointment 6/5/2024    Next Visit Date:  10/10/2024

## 2024-08-16 NOTE — TELEPHONE ENCOUNTER
Patient called back into the office regarding recent headaches that she has been having. She states that these have been very painful, and they are not going away. She states that she is planning on going to the ER.

## 2024-09-03 RX ORDER — TOPIRAMATE 200 MG/1
200 TABLET, FILM COATED ORAL 2 TIMES DAILY
Qty: 180 TABLET | Refills: 3 | OUTPATIENT
Start: 2024-09-03

## 2024-09-05 ENCOUNTER — TELEPHONE (OUTPATIENT)
Dept: NEUROLOGY | Age: 64
End: 2024-09-05

## 2024-10-10 ENCOUNTER — OFFICE VISIT (OUTPATIENT)
Dept: NEUROLOGY | Age: 64
End: 2024-10-10
Payer: COMMERCIAL

## 2024-10-10 VITALS
BODY MASS INDEX: 26.82 KG/M2 | SYSTOLIC BLOOD PRESSURE: 91 MMHG | HEART RATE: 75 BPM | HEIGHT: 60 IN | WEIGHT: 136.6 LBS | DIASTOLIC BLOOD PRESSURE: 56 MMHG

## 2024-10-10 DIAGNOSIS — G47.9 SLEEP DISTURBANCE: ICD-10-CM

## 2024-10-10 DIAGNOSIS — G40.209 COMPLEX PARTIAL SEIZURE DISORDER (HCC): ICD-10-CM

## 2024-10-10 DIAGNOSIS — G43.E11 INTRACTABLE CHRONIC MIGRAINE WITH AURA WITH STATUS MIGRAINOSUS: Primary | ICD-10-CM

## 2024-10-10 PROCEDURE — 3078F DIAST BP <80 MM HG: CPT | Performed by: PHYSICIAN ASSISTANT

## 2024-10-10 PROCEDURE — 3074F SYST BP LT 130 MM HG: CPT | Performed by: PHYSICIAN ASSISTANT

## 2024-10-10 PROCEDURE — 99214 OFFICE O/P EST MOD 30 MIN: CPT | Performed by: PHYSICIAN ASSISTANT

## 2024-10-10 RX ORDER — FREMANEZUMAB-VFRM 225 MG/1.5ML
225 INJECTION SUBCUTANEOUS
Qty: 1 ADJUSTABLE DOSE PRE-FILLED PEN SYRINGE | Refills: 5 | Status: SHIPPED | OUTPATIENT
Start: 2024-10-10

## 2024-10-10 NOTE — PROGRESS NOTES
visual fields intact to confrontation  III, IV, VI - extra-ocular muscles full: no pupillary defect; no VIRY, no nystagmus, no ptosis   V - normal facial sensation                                                               VII - normal facial symmetry                                                             VIII - intact hearing                                                                             IX, X - symmetrical palate                                                                  XI - symmetrical shoulder shrug                                                       XII - tongue midline without atrophy or fasciculation      Motor function  Normal muscle bulk and tone; strength 5/5 on all 4 extremities, no pronator drift      Sensory function Grossly normal      Cerebellar Intact fine motor movement. No involuntary movements or tremors. No ataxia or dysmetria on finger to nose or heel to shin testing      Reflex function DTR 2+ on bilateral UE and LE, symmetric.       Gait                   cautious with cane        ASSESSMENT / PLAN:   Rebecca Novak is a 64 y.o. female that established care with neurology for history of migraines and seizure.    Seizures are adequately prevented with current regimen. First ever seizure occurred in 2018 and it has been many years since last seizure. In the past, seizures were associated with hypoglycemic episodes.    Migraine with aura, without status, non-intractable  Restart Ajovy - will send through [facility] for rescue as this will not be covered with Ajovy per insurance - it would be best to remain on this product  Keep headache log  Update vision check  Incidentally receives Effexor from another provider, which is ineffective for migraine as well   Complex partial seizure disorder  Continue Topamax  Monitor for seizures  Daytime fatigue and sleep disturbance  Sleep study not completed due to cost  Memory issues  Sleep study not

## 2024-10-15 ENCOUNTER — TELEPHONE (OUTPATIENT)
Dept: NEUROLOGY | Age: 64
End: 2024-10-15

## 2024-10-15 NOTE — TELEPHONE ENCOUNTER
Received a fax from pharmacy stating Kylie ARMENTA. This was faxed to [facility] .

## 2024-10-21 NOTE — TELEPHONE ENCOUNTER
PA approved through 1/18/2025.     Attempted to notify patient; a message was left with this information as well as the office phone number in the event there are further questions.

## 2024-12-23 RX ORDER — TOPIRAMATE 200 MG/1
200 TABLET, FILM COATED ORAL 2 TIMES DAILY
Qty: 180 TABLET | Refills: 3 | Status: SHIPPED | OUTPATIENT
Start: 2024-12-23

## 2024-12-23 NOTE — TELEPHONE ENCOUNTER
Pharmacy requesting refill of Topamax 200 mg Rx.      Medication active on med list: yes      Date of last fill: 12/5/23 for #180 and 3 refills  verified on 12/23/2024        verified by Leonora NICKERSON LPN      Date of last appointment: 10/10/2024    Next Visit Date:  1/22/2025

## 2025-01-22 ENCOUNTER — OFFICE VISIT (OUTPATIENT)
Dept: NEUROLOGY | Age: 65
End: 2025-01-22
Payer: COMMERCIAL

## 2025-01-22 VITALS
HEART RATE: 91 BPM | WEIGHT: 137 LBS | BODY MASS INDEX: 26.9 KG/M2 | HEIGHT: 60 IN | DIASTOLIC BLOOD PRESSURE: 67 MMHG | SYSTOLIC BLOOD PRESSURE: 107 MMHG

## 2025-01-22 DIAGNOSIS — R20.0 LEFT LEG NUMBNESS: ICD-10-CM

## 2025-01-22 DIAGNOSIS — G40.209 COMPLEX PARTIAL SEIZURE DISORDER (HCC): ICD-10-CM

## 2025-01-22 DIAGNOSIS — G43.E11 INTRACTABLE CHRONIC MIGRAINE WITH AURA WITH STATUS MIGRAINOSUS: Primary | ICD-10-CM

## 2025-01-22 PROCEDURE — 3074F SYST BP LT 130 MM HG: CPT | Performed by: PHYSICIAN ASSISTANT

## 2025-01-22 PROCEDURE — 99214 OFFICE O/P EST MOD 30 MIN: CPT | Performed by: PHYSICIAN ASSISTANT

## 2025-01-22 PROCEDURE — 3078F DIAST BP <80 MM HG: CPT | Performed by: PHYSICIAN ASSISTANT

## 2025-01-22 RX ORDER — ARIPIPRAZOLE 2 MG/1
TABLET ORAL
COMMUNITY
Start: 2024-11-01

## 2025-01-22 RX ORDER — BLOOD SUGAR DIAGNOSTIC
STRIP MISCELLANEOUS
COMMUNITY
Start: 2024-11-21

## 2025-01-22 RX ORDER — VENLAFAXINE HYDROCHLORIDE 75 MG/1
CAPSULE, EXTENDED RELEASE ORAL DAILY
COMMUNITY
Start: 2024-12-20 | End: 2025-02-03

## 2025-01-22 RX ORDER — OXYCODONE AND ACETAMINOPHEN 5; 325 MG/1; MG/1
TABLET ORAL
COMMUNITY
Start: 2025-01-02
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
## 2025-02-26 DIAGNOSIS — G43.E11 INTRACTABLE CHRONIC MIGRAINE WITH AURA WITH STATUS MIGRAINOSUS: ICD-10-CM

## 2025-02-26 RX ORDER — FREMANEZUMAB-VFRM 225 MG/1.5ML
1.5 INJECTION SUBCUTANEOUS
Qty: 1.5 ML | Refills: 5 | Status: SHIPPED | OUTPATIENT
Start: 2025-03-31 | End: 2025-09-27

## 2025-02-26 NOTE — TELEPHONE ENCOUNTER
Pharmacy requesting refill of Fremanezumab-vfrm (AJOVY) 225 MG/1.5ML SOAJ         Medication active on med list yes      Date of last Rx: 10/10/2024 with 5 refills          verified by BRENDA TRAMMELL      Date of last appointment: 1/22/2025    Next Visit Date:  4/30/2025

## 2025-02-27 ENCOUNTER — TELEPHONE (OUTPATIENT)
Dept: NEUROLOGY | Age: 65
End: 2025-02-27

## 2025-05-07 ENCOUNTER — OFFICE VISIT (OUTPATIENT)
Dept: NEUROLOGY | Age: 65
End: 2025-05-07
Payer: COMMERCIAL

## 2025-05-07 VITALS
DIASTOLIC BLOOD PRESSURE: 72 MMHG | BODY MASS INDEX: 26.7 KG/M2 | SYSTOLIC BLOOD PRESSURE: 125 MMHG | WEIGHT: 136 LBS | HEART RATE: 98 BPM | HEIGHT: 60 IN

## 2025-05-07 DIAGNOSIS — G40.209 COMPLEX PARTIAL SEIZURE DISORDER (HCC): ICD-10-CM

## 2025-05-07 DIAGNOSIS — R20.8 ALLODYNIA: ICD-10-CM

## 2025-05-07 DIAGNOSIS — G43.E11 INTRACTABLE CHRONIC MIGRAINE WITH AURA WITH STATUS MIGRAINOSUS: Primary | ICD-10-CM

## 2025-05-07 DIAGNOSIS — M54.81 BILATERAL OCCIPITAL NEURALGIA: ICD-10-CM

## 2025-05-07 PROCEDURE — 3078F DIAST BP <80 MM HG: CPT | Performed by: PHYSICIAN ASSISTANT

## 2025-05-07 PROCEDURE — 1123F ACP DISCUSS/DSCN MKR DOCD: CPT | Performed by: PHYSICIAN ASSISTANT

## 2025-05-07 PROCEDURE — 3074F SYST BP LT 130 MM HG: CPT | Performed by: PHYSICIAN ASSISTANT

## 2025-05-07 PROCEDURE — 99214 OFFICE O/P EST MOD 30 MIN: CPT | Performed by: PHYSICIAN ASSISTANT

## 2025-05-07 RX ORDER — BACLOFEN 10 MG/1
10 TABLET ORAL 2 TIMES DAILY
Qty: 60 TABLET | Refills: 2 | Status: SHIPPED | OUTPATIENT
Start: 2025-05-07

## 2025-05-07 NOTE — PATIENT INSTRUCTIONS
Start baclofen at night only and add day dose if needed. WATCH FOR SLEEPINESS, DIZZINESS with this as I do not want you to fall
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
## 2025-05-13 ENCOUNTER — TELEPHONE (OUTPATIENT)
Dept: NEUROLOGY | Age: 65
End: 2025-05-13

## 2025-05-13 NOTE — TELEPHONE ENCOUNTER
Pt called in stating that she has tried the Baclofen for a week. She said that she has been having slurred speech and dizzy. She said that she has only been taking it at night and it continues into the day.    She did stop the gummies.    She is wondering if would be able to do the ONB now?    Please advise, thanks.

## 2025-05-14 NOTE — TELEPHONE ENCOUNTER
I called pt and instructed her to stop taking the Baclofen. I also stated that we would start a PA for the ONB. She stated her understanding.

## 2025-06-25 ENCOUNTER — TELEPHONE (OUTPATIENT)
Dept: NEUROLOGY | Age: 65
End: 2025-06-25

## 2025-06-25 ENCOUNTER — OFFICE VISIT (OUTPATIENT)
Dept: NEUROLOGY | Age: 65
End: 2025-06-25
Payer: COMMERCIAL

## 2025-06-25 VITALS
HEART RATE: 76 BPM | DIASTOLIC BLOOD PRESSURE: 70 MMHG | SYSTOLIC BLOOD PRESSURE: 102 MMHG | WEIGHT: 133.2 LBS | HEIGHT: 60 IN | BODY MASS INDEX: 26.15 KG/M2

## 2025-06-25 DIAGNOSIS — G43.E11 INTRACTABLE CHRONIC MIGRAINE WITH AURA WITH STATUS MIGRAINOSUS: Primary | ICD-10-CM

## 2025-06-25 DIAGNOSIS — G40.209 COMPLEX PARTIAL SEIZURE DISORDER (HCC): ICD-10-CM

## 2025-06-25 PROCEDURE — 3078F DIAST BP <80 MM HG: CPT | Performed by: PHYSICIAN ASSISTANT

## 2025-06-25 PROCEDURE — 1123F ACP DISCUSS/DSCN MKR DOCD: CPT | Performed by: PHYSICIAN ASSISTANT

## 2025-06-25 PROCEDURE — 99214 OFFICE O/P EST MOD 30 MIN: CPT | Performed by: PHYSICIAN ASSISTANT

## 2025-06-25 PROCEDURE — 3074F SYST BP LT 130 MM HG: CPT | Performed by: PHYSICIAN ASSISTANT

## 2025-06-25 RX ORDER — MECLIZINE HYDROCHLORIDE 25 MG/1
25 TABLET ORAL 3 TIMES DAILY PRN
COMMUNITY
Start: 2025-06-09

## 2025-06-25 RX ORDER — LISINOPRIL 2.5 MG/1
2.5 TABLET ORAL DAILY
COMMUNITY
Start: 2025-05-17 | End: 2025-06-25

## 2025-06-25 RX ORDER — MIDODRINE HYDROCHLORIDE 5 MG/1
5 TABLET ORAL 3 TIMES DAILY
COMMUNITY
Start: 2025-06-24

## 2025-06-25 RX ORDER — SAXAGLIPTIN 2.5 MG/1
2.5 TABLET, FILM COATED ORAL EVERY MORNING
COMMUNITY
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
## 2025-06-30 ENCOUNTER — PROCEDURE VISIT (OUTPATIENT)
Dept: NEUROLOGY | Age: 65
End: 2025-06-30
Payer: COMMERCIAL

## 2025-06-30 DIAGNOSIS — M54.81 BILATERAL OCCIPITAL NEURALGIA: Primary | ICD-10-CM

## 2025-06-30 PROCEDURE — 64405 NJX AA&/STRD GR OCPL NRV: CPT | Performed by: PHYSICIAN ASSISTANT

## 2025-06-30 PROCEDURE — 64450 NJX AA&/STRD OTHER PN/BRANCH: CPT | Performed by: PHYSICIAN ASSISTANT

## 2025-06-30 RX ORDER — LIDOCAINE HYDROCHLORIDE 20 MG/ML
7 INJECTION, SOLUTION INFILTRATION; PERINEURAL ONCE
Status: COMPLETED | OUTPATIENT
Start: 2025-06-30 | End: 2025-06-30

## 2025-06-30 RX ORDER — METHYLPREDNISOLONE SODIUM SUCCINATE 40 MG/ML
40 INJECTION INTRAMUSCULAR; INTRAVENOUS ONCE
Status: COMPLETED | OUTPATIENT
Start: 2025-06-30 | End: 2025-06-30

## 2025-06-30 RX ADMIN — LIDOCAINE HYDROCHLORIDE 7 ML: 20 INJECTION, SOLUTION INFILTRATION; PERINEURAL at 14:42

## 2025-06-30 RX ADMIN — METHYLPREDNISOLONE SODIUM SUCCINATE 40 MG: 40 INJECTION INTRAMUSCULAR; INTRAVENOUS at 14:47

## 2025-06-30 NOTE — PROGRESS NOTES
Elizabeth Simon PA-C      Procedure Note    Procedure: Bilateral Greater and Lesser Occipital Nerve Block (CPT code 32349, 42219)    Indications:  Severe bilateral occipital neuralgia (ICD 10 M54.81)    Informed consent was obtained (explaining the procedure and risks and benefits) from patient. The signed consent form was placed in the medical record.    A time out was completed, verifying correct patient, procedure,site, positioning, and implants or special equipment.    Patient's bilateral occipital area was palpated to identify location of the greater and the lesser occipital nerve. Using a 10 ml syringe, 7 ml of 1% lidocaine (from a 20 ml bottle) and 1 ml (40 mg) of methylprednisolone was aspirated. Alcohol was applied topically to the skin. Using a 27 gauge needle (aspirating during insertion),  2 ml was injected on the greater and lesser occipital nerve on bilateral sides. Pressure with a gauze pad was held briefly upon the site of puncture to minimize bleeding and to further spread anaesthetic subcutaneously.  There were no complications.  Patient was comfortable and left without complaint.    Empty vial of methylprednisolone was discarded.

## 2025-07-07 RX ORDER — LIDOCAINE 4 G/G
1 PATCH TOPICAL DAILY
Qty: 30 PATCH | Refills: 3 | Status: SHIPPED | OUTPATIENT
Start: 2025-07-07 | End: 2025-11-04

## 2025-07-28 ENCOUNTER — TELEPHONE (OUTPATIENT)
Dept: NEUROLOGY | Age: 65
End: 2025-07-28

## 2025-07-28 DIAGNOSIS — M54.81 BILATERAL OCCIPITAL NEURALGIA: Primary | ICD-10-CM

## 2025-07-28 NOTE — TELEPHONE ENCOUNTER
I called and relayed response to Rebecca, she has not tried oxcarbazepine and is agreeable to do so. She prefers [facility] [place] on [address]    Please review and send.

## 2025-07-28 NOTE — TELEPHONE ENCOUNTER
Rebecca called the office to inform Elizabeth that since getting the nerve block  she is having more headaches. They are happening daily around three or four in the afternoon. She will lay down (does not help) and still get more later on. She is able to take tylenol but it does not provide any relief. She stated that she is unable to take certain medications as she has to get clearance from nephrology. She would like to know if something else can be given to help.      Please review and advise.

## 2025-07-29 RX ORDER — OXCARBAZEPINE 150 MG/1
150 TABLET, FILM COATED ORAL 2 TIMES DAILY
Qty: 60 TABLET | Refills: 2 | Status: SHIPPED | OUTPATIENT
Start: 2025-07-29

## 2025-07-29 RX ORDER — OXCARBAZEPINE 150 MG/1
150 TABLET, FILM COATED ORAL 2 TIMES DAILY
Qty: 60 TABLET | Refills: 2 | Status: SHIPPED | OUTPATIENT
Start: 2025-07-29 | End: 2025-07-29